# Patient Record
Sex: MALE | Race: WHITE | Employment: OTHER | URBAN - METROPOLITAN AREA
[De-identification: names, ages, dates, MRNs, and addresses within clinical notes are randomized per-mention and may not be internally consistent; named-entity substitution may affect disease eponyms.]

---

## 2017-01-01 ENCOUNTER — APPOINTMENT (OUTPATIENT)
Dept: GENERAL RADIOLOGY | Age: 77
DRG: 196 | End: 2017-01-01
Attending: INTERNAL MEDICINE
Payer: MEDICARE

## 2017-01-01 ENCOUNTER — HOSPITAL ENCOUNTER (OUTPATIENT)
Dept: GENERAL RADIOLOGY | Age: 77
Discharge: HOME | End: 2017-12-14
Payer: MEDICARE

## 2017-01-01 ENCOUNTER — APPOINTMENT (OUTPATIENT)
Dept: ULTRASOUND IMAGING | Age: 77
DRG: 196 | End: 2017-01-01
Attending: INTERNAL MEDICINE
Payer: MEDICARE

## 2017-01-01 ENCOUNTER — HOSPITAL ENCOUNTER (OUTPATIENT)
Dept: NEUROLOGY | Age: 77
Discharge: HOME | End: 2017-10-25
Payer: MEDICARE

## 2017-01-01 ENCOUNTER — HOSPITAL ENCOUNTER (INPATIENT)
Age: 77
LOS: 5 days | Discharge: HOME | DRG: 196 | End: 2017-12-23
Attending: INTERNAL MEDICINE | Admitting: INTERNAL MEDICINE
Payer: MEDICARE

## 2017-01-01 ENCOUNTER — HOSPITAL ENCOUNTER (OUTPATIENT)
Age: 77
Discharge: HOME | End: 2017-12-14
Payer: MEDICARE

## 2017-01-01 ENCOUNTER — HOSPITAL ENCOUNTER (OUTPATIENT)
Age: 77
Discharge: HOME | End: 2017-05-30
Payer: MEDICARE

## 2017-01-01 ENCOUNTER — HOSPITAL ENCOUNTER (OUTPATIENT)
Age: 77
Discharge: HOME | End: 2017-07-17
Payer: MEDICARE

## 2017-01-01 ENCOUNTER — APPOINTMENT (OUTPATIENT)
Dept: CT IMAGING | Age: 77
DRG: 196 | End: 2017-01-01
Attending: INTERNAL MEDICINE
Payer: MEDICARE

## 2017-01-01 ENCOUNTER — HOSPITAL ENCOUNTER (OUTPATIENT)
Dept: NUCLEAR MEDICINE | Age: 77
Discharge: HOME | End: 2017-10-30
Payer: MEDICARE

## 2017-01-01 ENCOUNTER — HOSPITAL ENCOUNTER (OUTPATIENT)
Dept: NUCLEAR MEDICINE | Age: 77
Discharge: HOME | End: 2017-10-31
Payer: MEDICARE

## 2017-01-01 ENCOUNTER — HOSPITAL ENCOUNTER (OUTPATIENT)
Age: 77
Discharge: HOME | End: 2017-10-10
Payer: MEDICARE

## 2017-01-01 VITALS
HEIGHT: 72 IN | SYSTOLIC BLOOD PRESSURE: 100 MMHG | WEIGHT: 182 LBS | DIASTOLIC BLOOD PRESSURE: 59 MMHG | TEMPERATURE: 97.7 F | HEART RATE: 78 BPM | OXYGEN SATURATION: 96 % | BODY MASS INDEX: 24.65 KG/M2 | RESPIRATION RATE: 16 BRPM

## 2017-01-01 DIAGNOSIS — E03.1 CONGENITAL HYPOTHYROIDISM WITHOUT GOITER: ICD-10-CM

## 2017-01-01 DIAGNOSIS — R05.9 COUGH: ICD-10-CM

## 2017-01-01 DIAGNOSIS — R93.89 INFILTRATE NOTED ON IMAGING STUDY: ICD-10-CM

## 2017-01-01 LAB
ABSOLUTE EOS #: 0 K/UL (ref 0–0.4)
ABSOLUTE IMMATURE GRANULOCYTE: ABNORMAL K/UL (ref 0–0.3)
ABSOLUTE LYMPH #: 1.7 K/UL (ref 1–4.8)
ABSOLUTE MONO #: 0.9 K/UL (ref 0.2–0.8)
ANCA MYELOPEROXIDASE: 11 AU/ML
ANCA PROTEINASE 3: 8 AU/ML
ANGIOTENSIN-CONVERTING ENZYME: 27 U/L (ref 8–52)
ANION GAP SERPL CALCULATED.3IONS-SCNC: 12 MMOL/L (ref 9–17)
ANION GAP SERPL CALCULATED.3IONS-SCNC: 13 MMOL/L (ref 9–17)
ANION GAP SERPL CALCULATED.3IONS-SCNC: 14 MMOL/L (ref 9–17)
ANION GAP SERPL CALCULATED.3IONS-SCNC: 16 MMOL/L (ref 9–17)
ANION GAP SERPL CALCULATED.3IONS-SCNC: 17 MMOL/L (ref 9–17)
ANTI DNA DOUBLE STRANDED: 6 IU/ML
ANTI-NUCLEAR ANTIBODY (ANA): NEGATIVE
ANTI-NUCLEAR ANTIBODY (ANA): NEGATIVE
BASOPHILS # BLD: 0 % (ref 0–2)
BASOPHILS ABSOLUTE: 0 K/UL (ref 0–0.2)
BILIRUBIN URINE: NEGATIVE
BNP INTERPRETATION: ABNORMAL
BNP INTERPRETATION: ABNORMAL
BUN BLDV-MCNC: 12 MG/DL (ref 8–23)
BUN BLDV-MCNC: 16 MG/DL (ref 8–23)
BUN BLDV-MCNC: 17 MG/DL (ref 8–23)
BUN BLDV-MCNC: 20 MG/DL (ref 8–23)
BUN/CREAT BLD: 11 (ref 9–20)
BUN/CREAT BLD: 14 (ref 9–20)
BUN/CREAT BLD: 16 (ref 9–20)
BUN/CREAT BLD: 20 (ref 9–20)
CALCIUM IONIZED: 1.27 MMOL/L (ref 1.13–1.33)
CALCIUM IONIZED: 1.4 MMOL/L (ref 1.13–1.33)
CALCIUM SERPL-MCNC: 10 MG/DL (ref 8.6–10.4)
CALCIUM SERPL-MCNC: 10.5 MG/DL (ref 8.6–10.4)
CALCIUM SERPL-MCNC: 9.3 MG/DL (ref 8.6–10.4)
CALCIUM SERPL-MCNC: 9.4 MG/DL (ref 8.6–10.4)
CHLORIDE BLD-SCNC: 101 MMOL/L (ref 98–107)
CHLORIDE BLD-SCNC: 102 MMOL/L (ref 98–107)
CHLORIDE BLD-SCNC: 92 MMOL/L (ref 98–107)
CHLORIDE BLD-SCNC: 95 MMOL/L (ref 98–107)
CHLORIDE BLD-SCNC: 98 MMOL/L (ref 98–107)
CHOLESTEROL, FASTING: 147 MG/DL
CHOLESTEROL/HDL RATIO: 3.3
CO2: 21 MMOL/L (ref 20–31)
CO2: 21 MMOL/L (ref 20–31)
CO2: 25 MMOL/L (ref 20–31)
CO2: 26 MMOL/L (ref 20–31)
CO2: 27 MMOL/L (ref 20–31)
COLOR: YELLOW
COMMENT UA: NORMAL
CREAT SERPL-MCNC: 0.99 MG/DL (ref 0.7–1.2)
CREAT SERPL-MCNC: 1.03 MG/DL (ref 0.7–1.2)
CREAT SERPL-MCNC: 1.06 MG/DL (ref 0.7–1.2)
CREAT SERPL-MCNC: 1.23 MG/DL (ref 0.7–1.2)
CULTURE: NO GROWTH
CULTURE: NORMAL
DIFFERENTIAL TYPE: ABNORMAL
DIGOXIN DATE LAST DOSE: ABNORMAL
DIGOXIN DOSE AMOUNT: ABNORMAL
DIGOXIN DOSE TIME: ABNORMAL
DIGOXIN LEVEL: <0.3 NG/ML (ref 0.5–2)
DIRECT EXAM: NORMAL
EOSINOPHILS RELATIVE PERCENT: 0 % (ref 1–4)
FIO2: 21
FIO2: 21
GBM ANTIBODY IGG: 7 AU/ML
GFR AFRICAN AMERICAN: >60 ML/MIN
GFR NON-AFRICAN AMERICAN: 57 ML/MIN
GFR NON-AFRICAN AMERICAN: >60 ML/MIN
GFR SERPL CREATININE-BSD FRML MDRD: ABNORMAL ML/MIN/{1.73_M2}
GFR SERPL CREATININE-BSD FRML MDRD: NORMAL ML/MIN/{1.73_M2}
GLUCOSE BLD-MCNC: 118 MG/DL (ref 70–99)
GLUCOSE BLD-MCNC: 79 MG/DL (ref 70–99)
GLUCOSE BLD-MCNC: 84 MG/DL (ref 70–99)
GLUCOSE FASTING: 89 MG/DL (ref 70–99)
GLUCOSE URINE: NEGATIVE
HCT VFR BLD CALC: 37.5 % (ref 41–53)
HCT VFR BLD CALC: 38 % (ref 41–53)
HCT VFR BLD CALC: 38.7 % (ref 41–53)
HCT VFR BLD CALC: 38.9 % (ref 41–53)
HDLC SERPL-MCNC: 45 MG/DL
HEMOGLOBIN: 12.2 G/DL (ref 13.5–17.5)
HEMOGLOBIN: 12.7 G/DL (ref 13.5–17.5)
HEMOGLOBIN: 13 G/DL (ref 13.5–17.5)
HEMOGLOBIN: 13.1 G/DL (ref 13.5–17.5)
IMMATURE GRANULOCYTES: ABNORMAL %
KETONES, URINE: NEGATIVE
LDL CHOLESTEROL: 81 MG/DL (ref 0–130)
LEUKOCYTE ESTERASE, URINE: NEGATIVE
LV EF: 55 %
LVEF MODALITY: NORMAL
LYMPHOCYTES # BLD: 29 % (ref 24–44)
Lab: NORMAL
MAGNESIUM: 2 MG/DL (ref 1.6–2.6)
MCH RBC QN AUTO: 30.5 PG (ref 26–34)
MCH RBC QN AUTO: 31.3 PG (ref 26–34)
MCH RBC QN AUTO: 31.9 PG (ref 26–34)
MCH RBC QN AUTO: 32.8 PG (ref 26–34)
MCHC RBC AUTO-ENTMCNC: 32.6 G/DL (ref 31–37)
MCHC RBC AUTO-ENTMCNC: 32.9 G/DL (ref 31–37)
MCHC RBC AUTO-ENTMCNC: 33.5 G/DL (ref 31–37)
MCHC RBC AUTO-ENTMCNC: 34.1 G/DL (ref 31–37)
MCV RBC AUTO: 93.6 FL (ref 80–100)
MCV RBC AUTO: 95 FL (ref 80–100)
MCV RBC AUTO: 95.2 FL (ref 80–100)
MCV RBC AUTO: 96.2 FL (ref 80–100)
MONOCYTES # BLD: 15 % (ref 1–7)
MRSA, DNA, NASAL: NORMAL
MYCOPLASMA PNEUMONIAE IGM: 0.02
NEGATIVE BASE EXCESS, ART: 4 (ref 0–2)
NEGATIVE BASE EXCESS, ART: 4 (ref 0–2)
NITRITE, URINE: NEGATIVE
O2 DEVICE/FLOW/%: ABNORMAL
O2 DEVICE/FLOW/%: ABNORMAL
PATIENT TEMP: ABNORMAL
PATIENT TEMP: ABNORMAL
PDW BLD-RTO: 13 % (ref 11.5–14.5)
PDW BLD-RTO: 13.3 % (ref 11.5–14.5)
PDW BLD-RTO: 13.6 % (ref 11.5–14.5)
PDW BLD-RTO: 14.2 % (ref 11.5–14.5)
PH UA: 6 (ref 5–8)
PLATELET # BLD: 187 K/UL (ref 130–400)
PLATELET # BLD: 202 K/UL (ref 130–400)
PLATELET # BLD: 222 K/UL (ref 130–400)
PLATELET # BLD: 227 K/UL (ref 130–400)
PLATELET ESTIMATE: ABNORMAL
PMV BLD AUTO: 8 FL (ref 6–12)
PMV BLD AUTO: 8.6 FL (ref 6–12)
PMV BLD AUTO: ABNORMAL FL (ref 6–12)
PMV BLD AUTO: ABNORMAL FL (ref 6–12)
POC HCO3: 18.9 MMOL/L (ref 22–27)
POC HCO3: 20.2 MMOL/L (ref 22–27)
POC O2 SATURATION: 94 %
POC O2 SATURATION: 95 %
POC PCO2 TEMP: ABNORMAL MM HG
POC PCO2 TEMP: ABNORMAL MM HG
POC PCO2: 23 MM HG (ref 32–45)
POC PCO2: 30 MM HG (ref 32–45)
POC PH TEMP: ABNORMAL
POC PH TEMP: ABNORMAL
POC PH: 7.44 (ref 7.35–7.45)
POC PH: 7.52 (ref 7.35–7.45)
POC PO2 TEMP: ABNORMAL MM HG
POC PO2 TEMP: ABNORMAL MM HG
POC PO2: 60 MM HG (ref 75–95)
POC PO2: 71 MM HG (ref 75–95)
POSITIVE BASE EXCESS, ART: ABNORMAL (ref 0–2)
POSITIVE BASE EXCESS, ART: ABNORMAL (ref 0–2)
POTASSIUM SERPL-SCNC: 3.9 MMOL/L (ref 3.7–5.3)
POTASSIUM SERPL-SCNC: 4 MMOL/L (ref 3.7–5.3)
POTASSIUM SERPL-SCNC: 4.1 MMOL/L (ref 3.7–5.3)
POTASSIUM SERPL-SCNC: 4.3 MMOL/L (ref 3.7–5.3)
POTASSIUM SERPL-SCNC: 4.4 MMOL/L (ref 3.7–5.3)
PRO-BNP: 3074 PG/ML
PRO-BNP: 5220 PG/ML
PROCALCITONIN: 0.14 NG/ML
PROSTATE SPECIFIC ANTIGEN: 0.09 UG/L
PROTEIN UA: NEGATIVE
PTH INTACT: 5.68 PG/ML (ref 15–65)
RBC # BLD: 3.95 M/UL (ref 4.5–5.9)
RBC # BLD: 4 M/UL (ref 4.5–5.9)
RBC # BLD: 4.07 M/UL (ref 4.5–5.9)
RBC # BLD: 4.09 M/UL (ref 4.5–5.9)
RBC # BLD: ABNORMAL 10*6/UL
RHEUMATOID FACTOR: <10 IU/ML
SEG NEUTROPHILS: 56 % (ref 36–66)
SEGMENTED NEUTROPHILS ABSOLUTE COUNT: 3.3 K/UL (ref 1.8–7.7)
SODIUM BLD-SCNC: 129 MMOL/L (ref 135–144)
SODIUM BLD-SCNC: 133 MMOL/L (ref 135–144)
SODIUM BLD-SCNC: 137 MMOL/L (ref 135–144)
SODIUM BLD-SCNC: 140 MMOL/L (ref 135–144)
SODIUM BLD-SCNC: 141 MMOL/L (ref 135–144)
SPECIFIC GRAVITY UA: 1.01 (ref 1–1.03)
SPECIMEN DESCRIPTION: NORMAL
STATUS: NORMAL
TCO2 (CALC), ART: 20 MMOL/L (ref 23–28)
TCO2 (CALC), ART: 21 MMOL/L (ref 23–28)
THYROXINE, FREE: 0.8 NG/DL (ref 0.93–1.7)
THYROXINE, FREE: 0.85 NG/DL (ref 0.93–1.7)
TRIGLYCERIDE, FASTING: 106 MG/DL
TSH SERPL DL<=0.05 MIU/L-ACNC: 2.86 MIU/L (ref 0.3–5)
TSH SERPL DL<=0.05 MIU/L-ACNC: 3.57 MIU/L (ref 0.3–5)
TURBIDITY: CLEAR
URINE HGB: NEGATIVE
UROBILINOGEN, URINE: NORMAL
VLDLC SERPL CALC-MCNC: NORMAL MG/DL (ref 1–30)
WBC # BLD: 5.1 K/UL (ref 3.5–11)
WBC # BLD: 5.9 K/UL (ref 3.5–11)
WBC # BLD: 6 K/UL (ref 3.5–11)
WBC # BLD: 6.3 K/UL (ref 3.5–11)
WBC # BLD: ABNORMAL 10*3/UL

## 2017-01-01 PROCEDURE — 84439 ASSAY OF FREE THYROXINE: CPT

## 2017-01-01 PROCEDURE — G8978 MOBILITY CURRENT STATUS: HCPCS

## 2017-01-01 PROCEDURE — G8979 MOBILITY GOAL STATUS: HCPCS

## 2017-01-01 PROCEDURE — 83516 IMMUNOASSAY NONANTIBODY: CPT

## 2017-01-01 PROCEDURE — 80048 BASIC METABOLIC PNL TOTAL CA: CPT

## 2017-01-01 PROCEDURE — 6370000000 HC RX 637 (ALT 250 FOR IP): Performed by: UROLOGY

## 2017-01-01 PROCEDURE — 2700000000 HC OXYGEN THERAPY PER DAY

## 2017-01-01 PROCEDURE — 6370000000 HC RX 637 (ALT 250 FOR IP): Performed by: INTERNAL MEDICINE

## 2017-01-01 PROCEDURE — 82330 ASSAY OF CALCIUM: CPT

## 2017-01-01 PROCEDURE — 51798 US URINE CAPACITY MEASURE: CPT

## 2017-01-01 PROCEDURE — 86225 DNA ANTIBODY NATIVE: CPT

## 2017-01-01 PROCEDURE — 6360000002 HC RX W HCPCS: Performed by: INTERNAL MEDICINE

## 2017-01-01 PROCEDURE — 2060000000 HC ICU INTERMEDIATE R&B

## 2017-01-01 PROCEDURE — 84443 ASSAY THYROID STIM HORMONE: CPT

## 2017-01-01 PROCEDURE — 86431 RHEUMATOID FACTOR QUANT: CPT

## 2017-01-01 PROCEDURE — 2580000003 HC RX 258: Performed by: INTERNAL MEDICINE

## 2017-01-01 PROCEDURE — 71010 XR CHEST PORTABLE: CPT

## 2017-01-01 PROCEDURE — 86038 ANTINUCLEAR ANTIBODIES: CPT

## 2017-01-01 PROCEDURE — 76775 US EXAM ABDO BACK WALL LIM: CPT

## 2017-01-01 PROCEDURE — 84145 PROCALCITONIN (PCT): CPT

## 2017-01-01 PROCEDURE — 94664 DEMO&/EVAL PT USE INHALER: CPT

## 2017-01-01 PROCEDURE — 85027 COMPLETE CBC AUTOMATED: CPT

## 2017-01-01 PROCEDURE — 97530 THERAPEUTIC ACTIVITIES: CPT

## 2017-01-01 PROCEDURE — 95909 NRV CNDJ TST 5-6 STUDIES: CPT | Performed by: PHYSICAL MEDICINE & REHABILITATION

## 2017-01-01 PROCEDURE — 3430000000 HC RX DIAGNOSTIC RADIOPHARMACEUTICAL: Performed by: INTERNAL MEDICINE

## 2017-01-01 PROCEDURE — 87641 MR-STAPH DNA AMP PROBE: CPT

## 2017-01-01 PROCEDURE — 80061 LIPID PANEL: CPT

## 2017-01-01 PROCEDURE — 93306 TTE W/DOPPLER COMPLETE: CPT

## 2017-01-01 PROCEDURE — 97116 GAIT TRAINING THERAPY: CPT

## 2017-01-01 PROCEDURE — A9512 TC99M PERTECHNETATE: HCPCS | Performed by: INTERNAL MEDICINE

## 2017-01-01 PROCEDURE — 94761 N-INVAS EAR/PLS OXIMETRY MLT: CPT

## 2017-01-01 PROCEDURE — 97165 OT EVAL LOW COMPLEX 30 MIN: CPT

## 2017-01-01 PROCEDURE — 71020 XR CHEST STANDARD TWO VW: CPT

## 2017-01-01 PROCEDURE — G8987 SELF CARE CURRENT STATUS: HCPCS

## 2017-01-01 PROCEDURE — 36415 COLL VENOUS BLD VENIPUNCTURE: CPT

## 2017-01-01 PROCEDURE — 51702 INSERT TEMP BLADDER CATH: CPT

## 2017-01-01 PROCEDURE — A9528 IODINE I-131 IODIDE CAP, DX: HCPCS | Performed by: INTERNAL MEDICINE

## 2017-01-01 PROCEDURE — 1200000000 HC SEMI PRIVATE

## 2017-01-01 PROCEDURE — 95886 MUSC TEST DONE W/N TEST COMP: CPT | Performed by: PHYSICAL MEDICINE & REHABILITATION

## 2017-01-01 PROCEDURE — 70450 CT HEAD/BRAIN W/O DYE: CPT

## 2017-01-01 PROCEDURE — 87086 URINE CULTURE/COLONY COUNT: CPT

## 2017-01-01 PROCEDURE — 94760 N-INVAS EAR/PLS OXIMETRY 1: CPT

## 2017-01-01 PROCEDURE — 87040 BLOOD CULTURE FOR BACTERIA: CPT

## 2017-01-01 PROCEDURE — 87449 NOS EACH ORGANISM AG IA: CPT

## 2017-01-01 PROCEDURE — 82803 BLOOD GASES ANY COMBINATION: CPT

## 2017-01-01 PROCEDURE — G8988 SELF CARE GOAL STATUS: HCPCS

## 2017-01-01 PROCEDURE — 78014 THYROID IMAGING W/BLOOD FLOW: CPT

## 2017-01-01 PROCEDURE — 83880 ASSAY OF NATRIURETIC PEPTIDE: CPT

## 2017-01-01 PROCEDURE — 97535 SELF CARE MNGMENT TRAINING: CPT

## 2017-01-01 PROCEDURE — 85025 COMPLETE CBC W/AUTO DIFF WBC: CPT

## 2017-01-01 PROCEDURE — 76872 US TRANSRECTAL: CPT

## 2017-01-01 PROCEDURE — 94660 CPAP INITIATION&MGMT: CPT

## 2017-01-01 PROCEDURE — 94640 AIRWAY INHALATION TREATMENT: CPT

## 2017-01-01 PROCEDURE — 94150 VITAL CAPACITY TEST: CPT

## 2017-01-01 PROCEDURE — 84153 ASSAY OF PSA TOTAL: CPT

## 2017-01-01 PROCEDURE — 80162 ASSAY OF DIGOXIN TOTAL: CPT

## 2017-01-01 PROCEDURE — 81003 URINALYSIS AUTO W/O SCOPE: CPT

## 2017-01-01 PROCEDURE — 83970 ASSAY OF PARATHORMONE: CPT

## 2017-01-01 PROCEDURE — 97161 PT EVAL LOW COMPLEX 20 MIN: CPT

## 2017-01-01 PROCEDURE — 82164 ANGIOTENSIN I ENZYME TEST: CPT

## 2017-01-01 PROCEDURE — 80051 ELECTROLYTE PANEL: CPT

## 2017-01-01 PROCEDURE — 36600 WITHDRAWAL OF ARTERIAL BLOOD: CPT

## 2017-01-01 PROCEDURE — 83735 ASSAY OF MAGNESIUM: CPT

## 2017-01-01 PROCEDURE — 71250 CT THORAX DX C-: CPT

## 2017-01-01 PROCEDURE — 86738 MYCOPLASMA ANTIBODY: CPT

## 2017-01-01 RX ORDER — LUBIPROSTONE 8 UG/1
8 CAPSULE, GELATIN COATED ORAL 2 TIMES DAILY WITH MEALS
Status: DISCONTINUED | OUTPATIENT
Start: 2017-01-01 | End: 2017-01-01

## 2017-01-01 RX ORDER — IPRATROPIUM BROMIDE AND ALBUTEROL SULFATE 2.5; .5 MG/3ML; MG/3ML
1 SOLUTION RESPIRATORY (INHALATION)
Status: DISCONTINUED | OUTPATIENT
Start: 2017-01-01 | End: 2017-01-01

## 2017-01-01 RX ORDER — GABAPENTIN 100 MG/1
100 CAPSULE ORAL 3 TIMES DAILY
COMMUNITY

## 2017-01-01 RX ORDER — SODIUM CHLORIDE 0.9 % (FLUSH) 0.9 %
10 SYRINGE (ML) INJECTION PRN
Status: DISCONTINUED | OUTPATIENT
Start: 2017-01-01 | End: 2017-01-01 | Stop reason: HOSPADM

## 2017-01-01 RX ORDER — POLYETHYLENE GLYCOL 3350 17 G/17G
17 POWDER, FOR SOLUTION ORAL DAILY
Status: DISCONTINUED | OUTPATIENT
Start: 2017-01-01 | End: 2017-01-01

## 2017-01-01 RX ORDER — DIGOXIN 250 MCG
0.5 TABLET ORAL DAILY
Status: DISCONTINUED | OUTPATIENT
Start: 2017-01-01 | End: 2017-01-01 | Stop reason: SDUPTHER

## 2017-01-01 RX ORDER — FUROSEMIDE 10 MG/ML
20 INJECTION INTRAMUSCULAR; INTRAVENOUS ONCE
Status: COMPLETED | OUTPATIENT
Start: 2017-01-01 | End: 2017-01-01

## 2017-01-01 RX ORDER — ACETAMINOPHEN 325 MG/1
325 TABLET ORAL EVERY 4 HOURS PRN
Status: DISCONTINUED | OUTPATIENT
Start: 2017-01-01 | End: 2017-01-01 | Stop reason: HOSPADM

## 2017-01-01 RX ORDER — LEVOFLOXACIN 500 MG/1
500 TABLET, FILM COATED ORAL DAILY
Qty: 10 TABLET | Refills: 0 | Status: SHIPPED | OUTPATIENT
Start: 2017-01-01 | End: 2018-01-01

## 2017-01-01 RX ORDER — LEVOFLOXACIN 5 MG/ML
500 INJECTION, SOLUTION INTRAVENOUS EVERY 24 HOURS
Status: DISCONTINUED | OUTPATIENT
Start: 2017-01-01 | End: 2017-01-01 | Stop reason: HOSPADM

## 2017-01-01 RX ORDER — TAMSULOSIN HYDROCHLORIDE 0.4 MG/1
0.4 CAPSULE ORAL DAILY
Status: DISCONTINUED | OUTPATIENT
Start: 2017-01-01 | End: 2017-01-01 | Stop reason: HOSPADM

## 2017-01-01 RX ORDER — ALBUTEROL SULFATE 2.5 MG/3ML
2.5 SOLUTION RESPIRATORY (INHALATION)
Status: DISCONTINUED | OUTPATIENT
Start: 2017-01-01 | End: 2017-01-01 | Stop reason: HOSPADM

## 2017-01-01 RX ORDER — DIGOXIN 250 MCG
0.5 TABLET ORAL DAILY
Status: DISCONTINUED | OUTPATIENT
Start: 2017-01-01 | End: 2017-01-01 | Stop reason: CLARIF

## 2017-01-01 RX ORDER — TAMSULOSIN HYDROCHLORIDE 0.4 MG/1
0.4 CAPSULE ORAL DAILY
Qty: 30 CAPSULE | Refills: 3 | Status: SHIPPED | OUTPATIENT
Start: 2017-01-01

## 2017-01-01 RX ORDER — TAMSULOSIN HYDROCHLORIDE 0.4 MG/1
0.8 CAPSULE ORAL ONCE
Status: COMPLETED | OUTPATIENT
Start: 2017-01-01 | End: 2017-01-01

## 2017-01-01 RX ORDER — GABAPENTIN 100 MG/1
100 CAPSULE ORAL 2 TIMES DAILY
Status: DISCONTINUED | OUTPATIENT
Start: 2017-01-01 | End: 2017-01-01 | Stop reason: HOSPADM

## 2017-01-01 RX ORDER — SODIUM CHLORIDE 9 MG/ML
INJECTION, SOLUTION INTRAVENOUS CONTINUOUS
Status: DISCONTINUED | OUTPATIENT
Start: 2017-01-01 | End: 2017-01-01 | Stop reason: HOSPADM

## 2017-01-01 RX ORDER — ALBUTEROL SULFATE 2.5 MG/3ML
2.5 SOLUTION RESPIRATORY (INHALATION) 3 TIMES DAILY
Status: DISCONTINUED | OUTPATIENT
Start: 2017-01-01 | End: 2017-01-01

## 2017-01-01 RX ORDER — IPRATROPIUM BROMIDE AND ALBUTEROL SULFATE 2.5; .5 MG/3ML; MG/3ML
1 SOLUTION RESPIRATORY (INHALATION)
Status: DISCONTINUED | OUTPATIENT
Start: 2017-01-01 | End: 2017-01-01 | Stop reason: HOSPADM

## 2017-01-01 RX ORDER — DILTIAZEM HYDROCHLORIDE 360 MG/1
360 CAPSULE, EXTENDED RELEASE ORAL DAILY
Status: ON HOLD | COMMUNITY
End: 2017-01-01 | Stop reason: HOSPADM

## 2017-01-01 RX ORDER — ASPIRIN 81 MG/1
81 TABLET, CHEWABLE ORAL DAILY
Status: DISCONTINUED | OUTPATIENT
Start: 2017-01-01 | End: 2017-01-01 | Stop reason: HOSPADM

## 2017-01-01 RX ADMIN — ENOXAPARIN SODIUM 40 MG: 40 INJECTION SUBCUTANEOUS at 08:25

## 2017-01-01 RX ADMIN — IPRATROPIUM BROMIDE AND ALBUTEROL SULFATE 1 AMPULE: .5; 3 SOLUTION RESPIRATORY (INHALATION) at 19:56

## 2017-01-01 RX ADMIN — ASPIRIN 81 MG 81 MG: 81 TABLET ORAL at 09:23

## 2017-01-01 RX ADMIN — TAMSULOSIN HYDROCHLORIDE 0.4 MG: 0.4 CAPSULE ORAL at 07:48

## 2017-01-01 RX ADMIN — GABAPENTIN 100 MG: 100 CAPSULE ORAL at 09:23

## 2017-01-01 RX ADMIN — LUBIPROSTONE 8 MCG: 8 CAPSULE, GELATIN COATED ORAL at 10:58

## 2017-01-01 RX ADMIN — SODIUM CHLORIDE: 9 INJECTION, SOLUTION INTRAVENOUS at 00:37

## 2017-01-01 RX ADMIN — GABAPENTIN 100 MG: 100 CAPSULE ORAL at 15:47

## 2017-01-01 RX ADMIN — ENOXAPARIN SODIUM 40 MG: 40 INJECTION SUBCUTANEOUS at 10:10

## 2017-01-01 RX ADMIN — IPRATROPIUM BROMIDE AND ALBUTEROL SULFATE 1 AMPULE: .5; 3 SOLUTION RESPIRATORY (INHALATION) at 15:27

## 2017-01-01 RX ADMIN — POLYETHYLENE GLYCOL 3350 17 G: 17 POWDER, FOR SOLUTION ORAL at 16:47

## 2017-01-01 RX ADMIN — SODIUM CHLORIDE, PRESERVATIVE FREE 10 ML: 5 INJECTION INTRAVENOUS at 09:00

## 2017-01-01 RX ADMIN — ASPIRIN 81 MG 81 MG: 81 TABLET ORAL at 09:16

## 2017-01-01 RX ADMIN — ALBUTEROL SULFATE 2.5 MG: 2.5 SOLUTION RESPIRATORY (INHALATION) at 19:46

## 2017-01-01 RX ADMIN — IPRATROPIUM BROMIDE AND ALBUTEROL SULFATE 1 AMPULE: .5; 3 SOLUTION RESPIRATORY (INHALATION) at 16:01

## 2017-01-01 RX ADMIN — ENOXAPARIN SODIUM 40 MG: 40 INJECTION SUBCUTANEOUS at 15:47

## 2017-01-01 RX ADMIN — TAMSULOSIN HYDROCHLORIDE 0.4 MG: 0.4 CAPSULE ORAL at 09:16

## 2017-01-01 RX ADMIN — ASPIRIN 81 MG 81 MG: 81 TABLET ORAL at 07:48

## 2017-01-01 RX ADMIN — LEVOFLOXACIN 500 MG: 5 INJECTION, SOLUTION INTRAVENOUS at 13:14

## 2017-01-01 RX ADMIN — ENOXAPARIN SODIUM 40 MG: 40 INJECTION SUBCUTANEOUS at 10:57

## 2017-01-01 RX ADMIN — LUBIPROSTONE 8 MCG: 8 CAPSULE, GELATIN COATED ORAL at 17:41

## 2017-01-01 RX ADMIN — GABAPENTIN 100 MG: 100 CAPSULE ORAL at 07:48

## 2017-01-01 RX ADMIN — TAMSULOSIN HYDROCHLORIDE 0.4 MG: 0.4 CAPSULE ORAL at 09:23

## 2017-01-01 RX ADMIN — DILTIAZEM HYDROCHLORIDE 30 MG: 30 TABLET, FILM COATED ORAL at 09:23

## 2017-01-01 RX ADMIN — IPRATROPIUM BROMIDE AND ALBUTEROL SULFATE 1 AMPULE: .5; 3 SOLUTION RESPIRATORY (INHALATION) at 12:25

## 2017-01-01 RX ADMIN — IPRATROPIUM BROMIDE AND ALBUTEROL SULFATE 1 AMPULE: .5; 3 SOLUTION RESPIRATORY (INHALATION) at 15:44

## 2017-01-01 RX ADMIN — DILTIAZEM HYDROCHLORIDE 30 MG: 30 TABLET, FILM COATED ORAL at 09:16

## 2017-01-01 RX ADMIN — GABAPENTIN 100 MG: 100 CAPSULE ORAL at 09:16

## 2017-01-01 RX ADMIN — GABAPENTIN 100 MG: 100 CAPSULE ORAL at 20:18

## 2017-01-01 RX ADMIN — IPRATROPIUM BROMIDE AND ALBUTEROL SULFATE 1 AMPULE: .5; 3 SOLUTION RESPIRATORY (INHALATION) at 11:44

## 2017-01-01 RX ADMIN — IPRATROPIUM BROMIDE AND ALBUTEROL SULFATE 1 AMPULE: .5; 3 SOLUTION RESPIRATORY (INHALATION) at 11:42

## 2017-01-01 RX ADMIN — ASPIRIN 81 MG 81 MG: 81 TABLET ORAL at 08:24

## 2017-01-01 RX ADMIN — GABAPENTIN 100 MG: 100 CAPSULE ORAL at 20:47

## 2017-01-01 RX ADMIN — IPRATROPIUM BROMIDE AND ALBUTEROL SULFATE 1 AMPULE: .5; 3 SOLUTION RESPIRATORY (INHALATION) at 20:12

## 2017-01-01 RX ADMIN — ALBUTEROL SULFATE 2.5 MG: 2.5 SOLUTION RESPIRATORY (INHALATION) at 04:23

## 2017-01-01 RX ADMIN — DILTIAZEM HYDROCHLORIDE 30 MG: 30 TABLET, FILM COATED ORAL at 08:25

## 2017-01-01 RX ADMIN — LEVOFLOXACIN 500 MG: 5 INJECTION, SOLUTION INTRAVENOUS at 12:35

## 2017-01-01 RX ADMIN — ENOXAPARIN SODIUM 40 MG: 40 INJECTION SUBCUTANEOUS at 09:23

## 2017-01-01 RX ADMIN — GABAPENTIN 100 MG: 100 CAPSULE ORAL at 08:24

## 2017-01-01 RX ADMIN — POLYETHYLENE GLYCOL 3350 17 G: 17 POWDER, FOR SOLUTION ORAL at 09:23

## 2017-01-01 RX ADMIN — ASPIRIN 81 MG 81 MG: 81 TABLET ORAL at 10:56

## 2017-01-01 RX ADMIN — GABAPENTIN 100 MG: 100 CAPSULE ORAL at 10:57

## 2017-01-01 RX ADMIN — GABAPENTIN 100 MG: 100 CAPSULE ORAL at 21:25

## 2017-01-01 RX ADMIN — LEVOFLOXACIN 500 MG: 5 INJECTION, SOLUTION INTRAVENOUS at 12:23

## 2017-01-01 RX ADMIN — Medication 20 MICRO CURIE: at 09:00

## 2017-01-01 RX ADMIN — Medication 15 MILLICURIE: at 09:00

## 2017-01-01 RX ADMIN — ALBUTEROL SULFATE 2.5 MG: 2.5 SOLUTION RESPIRATORY (INHALATION) at 14:53

## 2017-01-01 RX ADMIN — TAMSULOSIN HYDROCHLORIDE 0.4 MG: 0.4 CAPSULE ORAL at 10:58

## 2017-01-01 RX ADMIN — ENOXAPARIN SODIUM 40 MG: 40 INJECTION SUBCUTANEOUS at 09:16

## 2017-01-01 RX ADMIN — GABAPENTIN 100 MG: 100 CAPSULE ORAL at 20:01

## 2017-01-01 RX ADMIN — IPRATROPIUM BROMIDE AND ALBUTEROL SULFATE 1 AMPULE: .5; 3 SOLUTION RESPIRATORY (INHALATION) at 08:13

## 2017-01-01 RX ADMIN — IPRATROPIUM BROMIDE AND ALBUTEROL SULFATE 1 AMPULE: .5; 3 SOLUTION RESPIRATORY (INHALATION) at 08:17

## 2017-01-01 RX ADMIN — ACETAMINOPHEN 325 MG: 325 TABLET ORAL at 05:54

## 2017-01-01 RX ADMIN — DILTIAZEM HYDROCHLORIDE 30 MG: 30 TABLET, FILM COATED ORAL at 07:48

## 2017-01-01 RX ADMIN — TAMSULOSIN HYDROCHLORIDE 0.8 MG: 0.4 CAPSULE ORAL at 20:01

## 2017-01-01 RX ADMIN — ALBUTEROL SULFATE 2.5 MG: 2.5 SOLUTION RESPIRATORY (INHALATION) at 09:53

## 2017-01-01 RX ADMIN — GABAPENTIN 100 MG: 100 CAPSULE ORAL at 21:04

## 2017-01-01 RX ADMIN — FUROSEMIDE 20 MG: 10 INJECTION, SOLUTION INTRAMUSCULAR; INTRAVENOUS at 06:32

## 2017-01-01 ASSESSMENT — PAIN SCALES - GENERAL
PAINLEVEL_OUTOF10: 0

## 2017-12-18 PROBLEM — R06.02 SHORTNESS OF BREATH: Status: ACTIVE | Noted: 2017-01-01

## 2017-12-18 PROBLEM — R09.02 HYPOXEMIA REQUIRING SUPPLEMENTAL OXYGEN: Status: ACTIVE | Noted: 2017-01-01

## 2017-12-18 PROBLEM — Z99.81 HYPOXEMIA REQUIRING SUPPLEMENTAL OXYGEN: Status: ACTIVE | Noted: 2017-01-01

## 2017-12-18 PROBLEM — J84.9 INTERSTITIAL LUNG DISEASE (HCC): Status: ACTIVE | Noted: 2017-01-01

## 2017-12-18 NOTE — PROGRESS NOTES
Pharmacy Accuracy Service Medication History Note    The patient's list of current home medications has been reviewed. Source(s) of information: patient, Teresa, [address]    Based on information provided by the above source(s), I have updated the patient's home med list as described below. Please review the ACTION REQUESTED BY PHYSICIAN section of this note below for any discrepancies on current hospital orders. I changed or updated the following medications on the patient's home medication list:  Discontinued  Minocycline 100mg   Digoxin 0.25mg (pt states this was stopped about a year ago; no Rx on file at [facility], no fills reported in [facility])     Added · Gabapentin 100mg TID     Adjusted   · Cardizem 30mg daily adjusted to Cardizem CD 360mg daily   Other Notes · He states he recently started a sample inhaler to use BID for short-term use but he couldn't remember the name of it. He couldn't remember what it looked like or identify it from my pictures of inhalers. PHYSICIAN ACTION REQUESTED  Discrepancies on current hospital orders that need to be addressed by a physician:    Medication Action Requested   Cardizem   Please adjust order to match home dose if appropriate. Gabapentin Please adjust order to match home dose if appropriate. Please feel free to call me with any questions about this encounter. Thank you.     Rd Bhagat, Mary  Pharmacy Medication Accuracy Review Service  Phone:  [phone]  Fax: [phone]      Electronically signed by Rd Bhagat, [facility] on 12/18/2017 at 6:31 PM

## 2017-12-18 NOTE — H&P
Social History     Social History    Marital status:      Spouse name: N/A    Number of children: N/A    Years of education: N/A     Occupational History    Not on file. Social History Main Topics    Smoking status: Former Smoker    Smokeless tobacco: Not on file    Alcohol use Yes    Drug use: No    Sexual activity: Not on file     Other Topics Concern    Not on file     Social History Narrative    No narrative on file       Family History:   Negative for DM, pulmonary problems, malignancy. REVIEW OF SYSTEMS:  CONSTITUTIONAL:  negative for  sweats, fatigue, malaise and anorexia  EYES:  negative for  double vision, blurred vision, dry eyes and blind spots  HEENT:  negative for  hearing loss, tinnitus, ear drainage, earaches and epistaxis  RESPIRATORY:  negative for  wheezing, hemoptysis and pleuritic pain, see present illness. CARDIOVASCULAR:  negative for  palpitations, orthopnea, PND, exertional chest pressure/discomfort, edema, syncope  GASTROINTESTINAL:  negative for vomiting, change in bowel habits, diarrhea, constipation, abdominal pain, dysphagia, reflux, odynophagia, hematemesis and hemtochezia  GENITOURINARY:  negative for dysuria, nocturia, hesitancy and hematuria  INTEGUMENT/BREAST:  negative for rash, skin lesion(s) and skin color change  HEMATOLOGIC/LYMPHATIC:  negative for easy bruising, bleeding, lymphadenopathy, petechiae and swelling/edema  ALLERGIC/IMMUNOLOGIC:  No known allergies. ENDOCRINE:  negative for heat intolerance, cold intolerance, tremor and change in bowel habits  MUSCULOSKELETAL:  negative for  joint swelling, decreased range of motion, muscle weakness and bone pain  NEUROLOGICAL:  negative for headaches, dizziness, seizures, memory problems, speech problems, visual disturbance, coordination problems and syncope. Recent dx of peripheral neuropathy. Started on neurontin - helping symptoms at current dose of 200 mg/day.   BEHAVIOR/PSYCH:  negative for depressed mood    PHYSICAL EXAM:  Vitals:  /72   Pulse 75   Temp 97.3 °F (36.3 °C) (Oral)   Resp 18   SpO2 97%     General Appearance: alert and oriented to person, place and time and in no acute distress  Skin: warm and dry, no rash or erythema and no suspicious lesions noted  Head: normocephalic and atraumatic  Eyes: pupils equal, round, and reactive to light, extraocular eye movements intact, conjunctivae normal and sclera anicteric  ENT: tympanic membrane, external ear and ear canal normal bilaterally, oropharynx clear and moist with normal mucous membranes  Neck: neck supple and non tender without mass, no thyromegaly, no thyroid nodules and no cervical adenopathy. trachea midline, no stridor. No neck vein distention, no carotid bruits. Pulmonary/Chest: diminished breath sounds, no wheezing, ronchi, rales or friction rubs. Cardiovascular: normal rate, regular rhythm, normal S1 and S2, no murmurs, no gallops, intact distal pulses and no carotid bruits  Abdomen: soft, non-tender, non-distended, bowel sounds normal, no masses, no organomegaly and no abdominal bruits  Genitourinary: deferred. Extremities: no cyanosis, no clubbing, no edema and Ananya's sign negative bilaterally  Musculoskeletal: no swollen joints, no joint deformity, no tender joints and no joint instability  Neurologic: reflexes normal and symmetric, no cranial nerve deficit and speech normal      DATA:  Chest X-ray (14 Dec):Diffuse interstitial opacities identified predominantly involving the lower lobes new since study of March 2014. Diff dx per radiologist: developing pulmonary edema, pneumonia or possibly interstitial lung process. Blood work (14 Dec): WBC  6.0    Hb 12.7  Hct 38.7  BUN/Creatinine: 16/1.03  Est GFR  >60  Calcium: 10.5    ASSESSMENT AND PLAN:      Active Problems:    Shortness of breath    Interstitial lung disease ([address]    Admission orders made. Meds reconciled. Details in EHR.       Electronically signed by Rekha Mariee MD on 12/18/2017 at 1:02 PM

## 2017-12-19 NOTE — CARE COORDINATION
Case Management Initial Discharge Plan  [place],         Readmission Risk              Readmission Risk:        4.75       Age 72 or Greater:  1    Admitted from SNF or Requires Paid or Family Care:  0    Currently has CHF,COPD,ARF,CRI,or is on dialysis:  0    Takes more than 5 Prescription Medications:  0    Takes Digoxin,Insulin,Anticoagulants,Narcotics or ASA/Plavix:  [address] in Past 12 Months:  0    On Disability:  0    Patient Considers own Health:  3.75            Met with:patient to discuss discharge plans. Information verified: address, contacts, phone number, , insurance Yes  PCP: Rudy Lucas MD  Date of last visit:     Insurance Provider:  Joleen Macias and Roshan Corrales    Discharge Planning  Current Residence:  Mobile home  Living Arrangements:  MAMI Gentile has 1 stories/4 stairs to climb  Support Systems:  Friends/Neighbors, Family Members  Current Services PTA:   none Agency:  none  Patient able to perform ADL's:Independent  DME in home:   none  DME used to aid ambulation prior to admission:    none  DME used during admission:   none    Potential Assistance Needed:  N/A    Pharmacy: [facility] Medications:  No  Does patient want to participate in local refill/ meds to beds program?  No    Patient agreeable to home care: No  [place] of choice provided:  n/a      Type of Home Care Services:  None  Patient expects to be discharged to:  Home    Prior SNF/Rehab Placement and Facility:  none  Agreeable to SNF/Rehab: No  [place] of choice provided: n/a   Evaluation: no    Expected Discharge date:  17  Follow Up Appointment: Best Day/ Time:      Transportation provider: self  Transportation arrangements needed for discharge: No    Discharge Plan:  Met with pt at bedside. Pt lives alone and is independent at home. Pt admitted with urinary retention and currently has a irizarry catheter.   Discussed potential for pt to be

## 2017-12-19 NOTE — PROGRESS NOTES
Dr. Bharathi Sims notified that patient just recently informed RN that he was having difficulty voiding. Patient voided 100 ml on his own and bladder scan was done to check post void residual and showed 462 ml of urine. Orders received to insert irizarry catheter, send urine for culture and prostate ultrasound.

## 2017-12-19 NOTE — FLOWSHEET NOTE
Patient states he is doing better and expressed no major needs or worries.  left prayer card for possible follow up. Patient grateful for the visit. 12/18/17 2012   Encounter Summary   Services provided to: Patient   Referral/Consult From: [facility] Family members   Continue Visiting ([phone])   Complexity of Encounter Low   Length of Encounter 15 minutes   Spiritual Assessment Completed Yes   Routine   Type Initial   Assessment Calm; Approachable;Passive   Intervention Explored feelings, thoughts, concerns; Discussed belief system/Orthodox practices/román;Discussed illness/injury and it's impact   Outcome Expressed gratitude

## 2017-12-19 NOTE — PROGRESS NOTES
Seen/Examined. Alert, in no distress. Hemodynamically stable. Echocardiogram: global left ventricular systolic function is normal with an estimated ejection fraction of 55%. No obvious wall motion abnormality. Mild      mitral/tricuspid regurg. Normal RV size and function. No pulmonary hypertension. Lungs: epsodic cough, whitish non-bloody sputum. Diminished breath sounds, mild ronchi, no rales/rubs. ABGs: hypoxemia    CT chest: multiple mediastinal lymph nodes, centrilobar /paraseptal emphysema. There is a 12x21 mm spiculated pleural parenchymal lesion in the       right apex. See details of report in EHR. Abdomen: complaints of constipation. Abdomen is soft, non-tender with no palpable mass. : noted acute urinary retention last evening with large residual on bladder scan requiring Viera insertion. RN informed me that there was some difficulty in inserting Viera. Prostate Ultrasound: prostatic seeds. No acute prostate abnormality identified. Predicted PSA 2.34    Assessment:    Emphysema, Interstitial Lung Disease. Right apical lesion concerning for possible malignancy. Urinary retention probably sec to bladder outlet obstruction (radiation induced). Plan:    Pulmonary consult -     Urology Consult    Aerosol Rxs - see physical findings. Discussed above with patient - expressed understanding.

## 2017-12-19 NOTE — PROGRESS NOTES
Lupe Gilliam, [facility]. Shortness of breath [R06.02]  Shortness of breath [R06.02] . Vitals:    12/19/17 0720   BP: 122/65   Pulse: 74   Resp: 20   Temp: 97.7 °F (36.5 °C)   SpO2: 96%   . Patients home meds are   Prior to Admission medications    Medication Sig Start Date End Date Taking? Authorizing Provider   diltiazem (CARDIZEM CD) 360 MG extended release capsule Take 360 mg by mouth daily   Yes Historical Provider, MD   gabapentin (NEURONTIN) 100 MG capsule Take 100 mg by mouth 3 times daily   Yes Historical Provider, MD   aspirin 81 MG tablet Take 81 mg by mouth daily.    Yes Historical Provider, MD   .  Recent Surgical History: None = 0     Assessment     FEV1/FVC    FEV1 Predicted       FEV1 1.8    FEV1 % Predicted 58%  FVC   IS volume   IBW   FIO2% RA   SPO2 96%  RR 18  Breath Sounds: dim      · Bronchodilator assessment at level  2  · Hyperinflation assessment at level   · Secretion Management assessment at level    ·   · [x]    Bronchodilator Assessment  BRONCHODILATOR ASSESSMENT SCORE  Score 0 1 2 3 4 5   Breath Sounds   []  Patient Baseline []  No Wheeze good aeration [x]  Faint, scattered wheezing, good aeration []  Expiratory Wheezing and or moderately diminished []  Insp/Exp wheeze and/or very diminished []  Insp/Exp and/ or marked distress   Respiratory Rate   []  Patient Baseline [x]  Less than 20 [x]  Less than 20 []  20-25 []  Greater than 25 []  Greater than 25   Peak flow % of Pred or PB []  NA   []  Greater than 90%  []  81-90% []  71-80% []  Less than or equal to 70%  or unable to perform []  Unable due to Respiratory Distress   Dyspnea re []  Patient Baseline [x]  No SOB [x]  No SOB []  SOB on exertion []  SOB min activity []  At rest/acute   e FEV% Predicted       []  NA []  Above 69%  []  Unable []  Above 60-69%  []  Unable [x]  Above 50-59%  []  Unable []  Above 35-49%  []  Unable []  Less than 35%  []  Unable                 []  Hyperinflation

## 2017-12-20 NOTE — PROGRESS NOTES
Seen/Examined. Vitals Stable. NSR, no ectopics. Pulmonary: states breathing is better with aerosol Rx. Compared to yesterday breath sounds are better. Awaiting for Pulmonary Service to evaluate. GI: still constipated - no response to Miralax. Abdomen is soft, non-distended, non-tender. Bowel sounds normal.  : Dr. Barriga Troy notes appreciated. Renal ultrasound: no evidence of hydronephrosis or intrarenal stones. Urine C/S report pending. Assessment: see yesterday's note. Plan:    Continuing current regimen    Awaiting Pulmonary eval.    D/C Miralax. Trial of Amitiza for constipation.

## 2017-12-20 NOTE — PLAN OF CARE
Problem: Falls - Risk of  Goal: Absence of falls  Outcome: Met This Shift  Siderails up x 2  Hourly rounding. Call light in reach. Instructed to call for assist before attempting out of bed. Remains free from falls and accidental injury at this time. Floor free from obstacles, and bed is locked and in lowest position. Adequate lighting provided.

## 2017-12-20 NOTE — CONSULTS
Inpatient consult to Pulmonology  Consult performed by: Michelle Scale  Consult ordered by: Himanshu Jimenes         Pulmonary Medicine and [address] MD      Patient - Alma Postal   MRN -  [id number]   Deniz # - [de-identified]   - 1940      Date of Admission -  2017 12:23 PM  Date of evaluation -  2017  Room -    Abimbola Edwards MD Primary Care Physician - Colby Beard MD     Reason for Consult    Pulmonary nodule    Assessment   · Spiculated right apical pulmonary nodule/fibrosis  · Pulmonary fibrosis/mediastinal lymphadenopathy  · 20 pack year smoking, quit in   · Arthritis  · A. fib    Recommendations   · Obtained collagen-vascular disease and vasculitis workup  · Since right apical pulmonary nodule could be part of the generalized pulmonary fibrosis seen on the CAT scan, I will proceed with PET/CT scan as next step, can be done as an outpatient. If positive will consider CT-guided biopsy. · Albuterol Q 4 hours prn  · Nocturnal pulse oximetry  · PFTs, can be done as an outpatient  · 2 liters/min via nasal cannula  · DVT prophylaxis with low molecular weight heparin  · Will follow with you    Problem List      Patient Active Problem List   Diagnosis    Chest pain    Shortness of breath    Interstitial lung disease ([address]    Hypoxemia requiring supplemental oxygen       HPI     Alma Postal is 76 y.o.,  male, admitted because of shortness of breath. A CT scan of the chest was done which reveals pulmonary fibrosis and a pulmonary nodule. Patient denies any chest pain, cough, significant shortness of breath. He denies any hemoptysis. He quit smoking in  you smoke less than pack a day, less than 20-pack-year smoking.   PMHx   Past Medical History      Diagnosis Date    Arthritis of both knees     Atrial fibrillation ([address]     Cancer [facility])     prostate  - resolved radiation seeds    Chest pain     rashes, itching, open sores    Vitals     height is 6' (1.829 m) and weight is 182 lb (82.6 kg). His oral temperature is 97.6 °F (36.4 °C). His blood pressure is 119/72 and his pulse is 92. His respiration is 16 and oxygen saturation is 96%. Body mass index is 24.68 kg/m². I/O      Intake/Output Summary (Last 24 hours) at 12/20/17 1650  Last data filed at 12/20/17 0916   Gross per 24 hour   Intake                0 ml   Output             3125 ml   Net            -3125 ml     I/O last 3 completed shifts:  In: -   Out: 4805 [Urine:3275]   Patient Vitals for the past 96 hrs (Last 3 readings):   Weight   12/18/17 1249 182 lb (82.6 kg)     Exam   General Appearance  Awake, alert, oriented, in no acute distress  HEENT - Head is normocephalic, atraumatic. Pupil reactive to light  Neck - Supple, symmetrical, trachea midline and Soft, trachea midline and straight  Lungs - positive findings: rales bibasilar, mild  Cardiovascular - Heart sounds are normal.  Regular rhythm normal rate without murmur, gallop or rub. Abdomen - Soft, nontender, nondistended, no masses or organomegaly  Neurologic - CN II-XII are grossly intact.  There are no focal motor or sensory deficits  Skin - No bruising or bleeding  Extremities - No cyanosis, clubbing or edema    Labs  - Old records and notes have been reviewed in [facility]   CBC   Lab Results   Component Value Date    WBC 6.0 12/14/2017    RBC 4.07 12/14/2017    HGB 12.7 12/14/2017    HCT 38.7 12/14/2017     12/14/2017    MCV 95.0 12/14/2017    MCH 31.3 12/14/2017    MCHC 32.9 12/14/2017    RDW 14.2 12/14/2017    LYMPHOPCT 38 04/01/2014    MONOPCT 11 04/01/2014    BASOPCT 1 04/01/2014    MONOSABS 0.71 04/01/2014    LYMPHSABS 2.46 04/01/2014    EOSABS 0.13 04/01/2014    BASOSABS 0.07 04/01/2014    DIFFTYPE NOT REPORTED 04/01/2014     BMP Lab Results   Component Value Date     12/14/2017    K 4.4 12/14/2017     12/14/2017    CO2 27 12/14/2017    BUN 16 12/14/2017

## 2017-12-20 NOTE — PROGRESS NOTES
[facility]                                                                                  Urology Progress Note            Subjective: Follow-up urinary retention, catheter removal today for trial of voiding    Patient Vitals for the past 24 hrs:   BP Temp Temp src Pulse Resp SpO2   12/20/17 0011 111/60 98.2 °F (36.8 °C) Oral 95 16 96 %   12/19/17 1934 116/67 98.2 °F (36.8 °C) Oral 91 16 95 %       Intake/Output Summary (Last 24 hours) at 12/20/17 0725  Last data filed at 12/20/17 0503   Gross per 24 hour   Intake                0 ml   Output             5375 ml   Net            -5375 ml       No results for input(s): WBC, HGB, HCT, MCV, PLT in the last 72 hours. No results for input(s): NA, K, CL, CO2, PHOS, BUN, CREATININE in the last 72 hours.     Invalid input(s): CA    Recent Labs      12/19/17   0306   COLORU  YELLOW   PHUR  6.0   SPECGRAV  1.015   LEUKOCYTESUR  NEGATIVE   UROBILINOGEN  Normal   BILIRUBINUR  NEGATIVE       Additional Lab/culture results:    Physical Exam: Patient in bed, not in acute distress, discussed catheter removal and Flomax to alleviate bladder outlet obstruction    Interval Imaging Findings:    Impression:  Prostate cancer urinary retention  Patient Active Problem List   Diagnosis    Chest pain    Shortness of breath    Interstitial lung disease ([address]    Hypoxemia requiring supplemental oxygen       Plan: Renal ultrasound today, to rule out hydronephrosis    Gregoria Gist  7:25 AM 12/20/2017

## 2017-12-20 NOTE — CONSULTS
[facility]  Urology Consultation    Patient:  Fabian Davis  MRN: [id number]  YOB: 1940    CHIEF COMPLAINT:  Urinary retention prostate cancer    HISTORY OF PRESENT ILLNESS:   The patient is a 76 y.o. male who presents with difficulty emptying the bladder, prostate and C weakness of the stream, bladder scan demonstrated 500 mL residual.    The patient has a history of prostate cancer, he is status post radiation therapy, PSA levels have been quite low and the prostate cancer under good control    Patient's old records, notes and chart reviewed and summarized above. Past Medical History:    Past Medical History:   Diagnosis Date    Arthritis of both knees     [facility]     Cancer ([facility]     prostate  - resolved radiation seeds    Chest pain     History of cardiac cath     Hx MRSA infection resolved 12/2017    2 negative nasal screen - 12/2017 (right knee - 2009)    Sweating        Past Surgical History:    Past Surgical History:   Procedure Laterality Date    APPENDECTOMY      JOINT REPLACEMENT Bilateral     knees    TONSILLECTOMY       Previous  surgery: Radiation therapy for prostate cancer     Medications:    Scheduled Meds:   polyethylene glycol  17 g Oral Daily    albuterol  2.5 mg Nebulization TID    tamsulosin  0.4 mg Oral Daily    aspirin  81 mg Oral Daily    diltiazem  30 mg Oral Daily    gabapentin  100 mg Oral BID    enoxaparin  40 mg Subcutaneous Daily     Continuous Infusions:   PRN Meds:.albuterol    Allergies:  Review of patient's allergies indicates no known allergies. Social History:    Social History     Social History    Marital status:      Spouse name: N/A    Number of children: N/A    Years of education: N/A     Occupational History    Not on file.      Social History Main Topics    Smoking status: Former Smoker    Smokeless tobacco: Never Used      Comment: quit smoking greater than 30 years ago    Alcohol use Yes   

## 2017-12-21 PROBLEM — N20.1 URETERAL CALCULUS, RIGHT: Status: ACTIVE | Noted: 2017-01-01

## 2017-12-21 PROBLEM — C61 CA PROSTATE, ADENOCA (HCC): Status: ACTIVE | Noted: 2017-01-01

## 2017-12-21 NOTE — CARE COORDINATION
Discharge planning    Patient potential for home oxygen and has medicare. Did send referral to [facility] to run his benefits if 02 needed. If home with 02 might want to consider home care also.  Will continue to follow closely

## 2017-12-21 NOTE — PROGRESS NOTES
At approximately [phone], patient was very short of breath and wheezing, stating \"I can't breathe. \" Respiratory therapist called to room for breathing treatment. At that time, patient's oxygen level was 76% on 2 liters O2 per nasal cannula. Breathing treatment administered. Blood pressure assessed as charted. Following breathing treatment, patient was placed on venti mask at 40%. Patient appeared calmer and O2 saturation was 90%. Patient had 6 beat run of V-tach per telemetry monitor at 0445. Dr. Lizz Acosta notified of patient's dififculty breathing, oxygen level, and run of V-tach. Order to transfer patient to [facility]. Dr. Lizz Acosta also stated to call Dr. Orville Barker, pulmonary, to notify him in patient's condition.

## 2017-12-21 NOTE — PROGRESS NOTES
[facility]                                                                                  Urology Progress Note            Subjective: follow up urine retention ca prostate     Patient Vitals for the past 24 hrs:   BP Temp Temp src Pulse Resp SpO2   12/21/17 1148 105/60 98.8 °F (37.1 °C) Axillary 104 20 92 %   12/21/17 1142 - - - - - 93 %   12/21/17 1103 - - - - - 96 %   12/21/17 0942 - - - - - 92 %   12/21/17 0717 (!) 99/48 98.6 °F (37 °C) Axillary 108 20 92 %   12/21/17 0550 - - - - - 91 %   12/21/17 0530 - 100.8 °F (38.2 °C) Axillary - - -   12/21/17 0528 - 100.1 °F (37.8 °C) Oral - - -   12/21/17 0443 - - - - - 90 %   12/21/17 0430 (!) 141/74 - - 106 - -   12/20/17 1946 104/76 99.9 °F (37.7 °C) Oral 117 22 93 %       Intake/Output Summary (Last 24 hours) at 12/21/17 1245  Last data filed at 12/21/17 1151   Gross per 24 hour   Intake              866 ml   Output             1750 ml   Net             -884 ml       No results for input(s): WBC, HGB, HCT, MCV, PLT in the last 72 hours. Recent Labs      12/20/17   2231  12/21/17   0757   NA  129*  133*   K  4.3  4.1   CL  92*  95*   CO2  21 21   BUN  17   --    CREATININE  1.23*   --        Recent Labs      12/19/17   0306   COLORU  YELLOW   PHUR  6.0   SPECGRAV  1.015   LEUKOCYTESUR  NEGATIVE   UROBILINOGEN  Normal   BILIRUBINUR  NEGATIVE       Additional Lab/culture results:    Physical Exam: Patient in bed, not in acute distress, voiding better, no bladder distention, no epididymitis or orchitis, no scrotal swelling    Interval Imaging Findings:    Impression: Acute kidney injury, chronic kidney disease   repeat BUN/creatinine a.m. Prostate cancer status post radiation therapy,   urinary retention improved.   Findings noted on the CT of the chest being monitored by pulmonary medicine  Patient Active Problem List   Diagnosis    Chest pain    Shortness of breath    Interstitial lung disease ([address]    Hypoxemia requiring supplemental oxygen       Plan: Discussed with the patient monitoring postvoid residual, discussed cystoscopy,    Servando Anderson  12:45 PM 12/21/2017

## 2017-12-21 NOTE — PROGRESS NOTES
Pulmonary Critical Care Progress Note  Reema Lemons CNP / Dr. Hussein Jacques M.D. Patient seen for the follow up of Pulmonary nodule/pulmonary fibrosis/pulmonary edema    Subjective:  He had significant increase in shortness of breath on the night last night as well as noted hypoxia with an oxygen saturation of 76% while on 2 L nasal cannula. At this time his shortness of breath has improved. He has an occasional cough now with blood-tinged sputum. He denies any chest pain. Examination:  Vitals: BP (!) 99/48   Pulse 108   Temp 98.6 °F (37 °C) (Axillary)   Resp 20   Ht 6' (1.829 m)   Wt 182 lb (82.6 kg)   SpO2 92%   BMI 24.68 kg/m²     General appearance: alert and cooperative with exam, no acute distress  Neck: No JVD  Lungs: Decreased air exchange, crackles  Heart: regular rate and rhythm, S1, S2 normal, no gallop  Abdomen: Soft, non tender, + BS  Extremities: no cyanosis or clubbing. No significant edema    LABs:  CBC: No results for input(s): WBC, HGB, HCT, PLT in the last 72 hours. BMP:   Recent Labs      12/20/17   2231  12/21/17   0757   NA  129*  133*   K  4.3  4.1   CO2  21  21   BUN  17   --    CREATININE  1.23*   --    LABGLOM  57*   --    GLUCOSE  118*   --      Radiology:      Impression:  · Acute hypoxic respiratory failure  · Spiculated right apical pulmonary nodule/fibrosis  · Pulmonary fibrosis  · Mediastinal lymphadenopathy  · Pulmonary opacity/Pulmonary edema/hemoptysis  · Fever  · 20-pack-year smoking, quit in 1978  · Arthritis  · Atrial fibrillation    Recommendations:  · Start Levaquin  · IV Lasix x 1  · Since right apical pulmonary nodule could be part of the generalized pulmonary fibrosis seen on CT scan, we will proceed with PET/CT which can be done as an outpatient.   If PET/CT is positive, we will consider CT-guided biopsy  · Add Albuterol and Ipratropium Q 4 hours and prn  · Blood Cultures/sputum cultures  · Check pro calcitonin  · X-ray chest in am  · Labs: CBC and BMP in am  · BNP  · Check ProCalcitonin  · Keep oxygen saturation greater than 92%  · PFTs which may be done as outpatient  · Home O2 evaluation prior to discharge  · DVT prophylaxis with low molecular weight heparin  · Will follow with you    Electronically signed by     James Akhtar CNP on 12/21/2017 at 10:38 AM  Patient was seen under the supervision of Dr. Hao Plaza and Sleep Medicine,    Patient seen and evaluated by me. Patient developed to increased shortness of breath because of pulmonary edema was diuresed last night and now is feeling slightly better. At this time plan is to continue bronchodilators Lasix ×1 and start Levaquin. PET/CT when available to decide about biopsy need for the right apical nodule. Above was reviewed and discussed with NP. And I agree with assessment and plan of care.   Electronically signed by     Akin Beal MD on 12/21/2017 at 12:55 PM  Pulmonary Critical Care and Sleep Medicine,  [facility]  Cell: [phone]  Office: [phone]

## 2017-12-21 NOTE — PROGRESS NOTES
Pharmacy Note  Levaquin  Consult    Sandi Garcia is a 76 y.o. male started on levaquin for pulmonary nodule/fibrosis; consult received from Dr. Bimal Wells to manage therapy. Patient Active Problem List   Diagnosis    Chest pain    Shortness of breath    Interstitial lung disease ([address]    Hypoxemia requiring supplemental oxygen       Allergies:  Review of patient's allergies indicates no known allergies. Temp max: 100.8F    Recent Labs      12/20/17   2231   BUN  17       Recent Labs      12/20/17   2231   CREATININE  1.23*       No results for input(s): WBC in the last 72 hours. Intake/Output Summary (Last 24 hours) at 12/21/17 1533  Last data filed at 12/21/17 1519   Gross per 24 hour   Intake 866 ml   Output 2075 ml   Net -1209 ml       Culture Date      Source                       Results  12/21/17 blood x 2 pending     Ht Readings from Last 1 Encounters:   12/18/17 6' (1.829 m)        Wt Readings from Last 1 Encounters:   12/18/17 182 lb (82.6 kg)         Body mass index is 24.68 kg/m². Estimated Creatinine Clearance: 53 mL/min (based on SCr of 1.23 mg/dL). Assessment/Plan:  Will initiate levaquin 500mg IV every 24 hours. Thank you for the consult. Will continue to follow.    Genesis Arroyo [facility]  12/21/2017  3:38 PM

## 2017-12-21 NOTE — PROGRESS NOTES
Dr. Nakul Murphy notified of change in patient's condition including shortness of breath and oxygen level on venti mask at 40% FiO2. Order for chest xray received and to call Dr. Ottoniel Tadeo results are bad. \"

## 2017-12-21 NOTE — PROGRESS NOTES
Patient moved from room 2016 to room 2020 to be closer to the nurse's station for patient's safety. Telesitter present in room.

## 2017-12-22 PROBLEM — R33.8 ACUTE URINARY RETENTION: Status: ACTIVE | Noted: 2017-01-01

## 2017-12-22 PROBLEM — J43.2 CENTRILOBULAR EMPHYSEMA (HCC): Status: ACTIVE | Noted: 2017-01-01

## 2017-12-22 PROBLEM — J84.10 PULMONARY FIBROSIS (HCC): Status: ACTIVE | Noted: 2017-01-01

## 2017-12-22 NOTE — PROGRESS NOTES
Seen/examined. Breathing better. Hemodynamically stable. Diminished breath sounds but no wheezing/ronchi/rales/rubs. Abdomen/Extremities unremarkable. Pul notes appreciated. Labs - negative immunologic studies. X-ray: diffuse bilateral opacities improved from previous study. Do home O2 eval. Orders entered. Discussed with patient/expressed understanding. Continuing current regimen.

## 2017-12-22 NOTE — PROGRESS NOTES
NOX held as pt is still requiring BiPAP. Will continue to monitor and complete NOX closer to discharge and when more appropriate.

## 2017-12-22 NOTE — PROGRESS NOTES
Spoke with Nelida Slater in scheduling, there is no available times today to get PET scan completed, outpatient appointment made for next Friday 12/29/17 at 11:00 am, patient needs to arrive at 10:30 am at

## 2017-12-22 NOTE — PROGRESS NOTES
Rocky Ramirez                                                                                  Urology Progress Note            Subjective: Patient seen in conjunction with nursing staff   Follow-up prostate cancer urinary retention, chronic kidney disease    Patient Vitals for the past 24 hrs:   BP Temp Temp src Pulse Resp SpO2   12/22/17 0815 - - - - - 94 %   12/22/17 0735 124/72 98.4 °F (36.9 °C) Oral 110 24 92 %   12/22/17 0500 - - - - - 95 %   12/22/17 0430 - - - - 21 95 %   12/22/17 0005 - - - - 23 96 %   12/21/17 2311 116/60 98.6 °F (37 °C) Oral 102 21 97 %   12/21/17 2020 - - - - 23 97 %   12/21/17 2012 - - - - - 97 %   12/21/17 1921 120/64 99.5 °F (37.5 °C) Oral 124 24 98 %   12/21/17 1716 - - - - 21 96 %   12/21/17 1614 (!) 112/59 100 °F (37.8 °C) Oral 110 20 92 %   12/21/17 1525 - - - - - 95 %   12/21/17 1148 105/60 98.8 °F (37.1 °C) Axillary 104 20 92 %   12/21/17 1142 - - - - - 93 %       Intake/Output Summary (Last 24 hours) at 12/22/17 1116  Last data filed at 12/22/17 1013   Gross per 24 hour   Intake              660 ml   Output             3525 ml   Net            -2865 ml       Recent Labs      12/22/17   0630   WBC  5.9   HGB  12.2*   HCT  37.5*   MCV  93.6   PLT  187     Recent Labs      12/20/17   2231  12/21/17   0757  12/22/17   0630   NA  129*  133*  137   K  4.3  4.1  3.9   CL  92*  95*  98   CO2  21  21  25   BUN  17   --   20   CREATININE  1.23*   --   0.99       No results for input(s): COLORU, PHUR, LABCAST, WBCUA, RBCUA, MUCUS, TRICHOMONAS, YEAST, BACTERIA, CLARITYU, SPECGRAV, LEUKOCYTESUR, UROBILINOGEN, BILIRUBINUR, BLOODU in the last 72 hours. Invalid input(s): NITRATE, GLUCOSEUKETONESUAMORPHOUS    Additional Lab/culture results:    Physical Exam: Patient in bed, family at bedside,   patient doing well, alert oriented, no abdominal pain no dysuria,   repeat bladder scan discussed.   Discussed cystoscopy as an outpatient, patient agreeable    We

## 2017-12-22 NOTE — PLAN OF CARE
Problem: Falls - Risk of  Goal: Absence of falls  Outcome: Met This Shift  No attempts at unassisted ambulation, needs met with rounds and call light, no injuries this shift    Problem: Gas Exchange - Impaired:  Goal: Levels of oxygenation will improve  Levels of oxygenation will improve  Outcome: Ongoing  Pt cont on BiPAP at 35% FiO2, saturation 95-98%, lungs as charted, pt desaturates to 84% on room air, as shift progressed and pt taken off BiPAP for fluid, oxygenation was 88-90%, pt cont to be alert, and tolerating tx

## 2017-12-23 PROBLEM — J96.21 ACUTE ON CHRONIC RESPIRATORY FAILURE WITH HYPOXIA (HCC): Status: ACTIVE | Noted: 2017-01-01

## 2017-12-23 NOTE — CARE COORDINATION
Pt qualifies for home 02. Script written for leonidas e02 4 L cont. Called and spoke with Eduardo Freitas with [facility] [place] for delivery. Eduardo Freitas will be here in 20 minute with delivery of portable tank. Demos, script, home 02 blaise and progress notes faxed to Jihan Juan. RN informed.

## 2017-12-23 NOTE — PROGRESS NOTES
recommendations, safety with mob and transfers, pacing, and breathing techs  Discharge Recommendations: Home with Home health OT; Home with assist PRN  REQUIRES OT FOLLOW UP: Yes  Activity Tolerance  Activity Tolerance: Patient Tolerated treatment well  Safety Devices  Safety Devices in place: Yes  Type of devices: Nurse notified; Left in chair;Call light within reach;Gait belt;Patient at risk for falls        Discharge Recommendations:  Home with Home health OT, Home with assist PRN     Plan   Plan  Times per week: 4-5x/week, 1-2x/day  Current Treatment Recommendations: Strengthening, Balance Training, Functional Mobility Training, Endurance Training, Self-Care / ADL, Safety Education & Training, Patient/Caregiver Education & Training, Equipment Evaluation, Education, & procurement    G-Code  OT G-codes  Functional Assessment Tool Used: Amna Fatima \"6 clicks\" Inpatient daily activity short form  Score: 19  Functional Limitation: Self care  Self Care Current Status ():  At least 40 percent but less than 60 percent impaired, limited or restricted  Self Care Goal Status (): 0 percent impaired, limited or restricted  OutComes Score                                           AM-PAC Score        AM-[facility] Inpatient Daily Activity Raw Score: 19  AM-PAC Inpatient ADL T-Scale Score : 40.22  ADL Inpatient CMS 0-100% Score: 42.8  ADL Inpatient CMS G-Code Modifier : CK    Goals  Short term goals  Time Frame for Short term goals: by discharge, pt will  Short term goal 1: demo S/I with ADL transfers with good safety  Short term goal 2: demo S/I with functional mob for ADL completion with good safety  Short term goal 3: demo S/I with toileting routine with good safety  Short term goal 4: demo S/I with UB/LB ADLs with good safety and pacing  Short term goal 5: verb good understanding of fall prevention techs, EC/WS techs, and possible equip needs  Patient Goals   Patient goals : to go home       Therapy Time   Individual Concurrent Group Co-treatment   Time In 1003         Time Out 1         Minutes 22               Pt would benefit from skilled home OT services in order to maximize occupational performance, safety, and independence in the home environment.    Hieu Liriano, OT

## 2017-12-23 NOTE — PROGRESS NOTES
Physical Therapy    Facility/Department: [facility] PROGRESSIVE CARE  Initial Assessment    NAME: Sayda Nurse  : 1940  MRN: [id number]  Discharge Recommendation:   Home with assist PRN, Home with Home health PT  Date of Service: 2017  Per H&P: 76year old male seen in the office on  for follow-up on his EMG (extremities) results (peripheral neuropathy). At that time he also had an additional complaint of cough and a low grade fever. He was diagnosed with bronchitis, prescribed  Ceftin for a week with good response. He was seen back  in the office on Dec 14 with a one day history of hoarseness of his voice There was no stridor. He had hoarse breath sounds but there were no ronchi, rales or friction rubs. He did complaint of exertional dyspnea. He presented to the office today with increasing dyspnea on exertion which on further questioning he stated that he had been noticing this for over two months. No pleuritic chest pains or wheezing, PND, orthopnea, palpitations, or precordial chest pains  Patient Diagnosis(es): There were no encounter diagnoses. has a past medical history of Arthritis of both knees; Atrial fibrillation ([address] Cancer ([address] Chest pain; History of cardiac cath; Hx MRSA infection; and Sweating. has a past surgical history that includes Appendectomy; Tonsillectomy; and joint replacement (Bilateral).     Restrictions  Restrictions/Precautions  Restrictions/Precautions: General Precautions, Fall Risk  Position Activity Restriction  Other position/activity restrictions: 02 2L; dropped to 88% without O2 in room  Vision/Hearing        Subjective  General  Chart Reviewed: Yes  Patient assessed for rehabilitation services?: Yes  Additional Pertinent Hx: a-fib, OA  Response To Previous Treatment: Not applicable  Family / Caregiver Present: No  Diagnosis: Interstitial lung disease, hypoxemia  Follows Commands: Within Functional Limits  General Comment  Comments: Silvana Link RN reports patient appropriate for PT. Subjective  Subjective: Patient pleasant and agreeable to PT.   Pain Screening  Patient Currently in Pain: Denies  Vital Signs  Patient Currently in Pain: Denies  Pre Treatment Pain Screening  Intervention List: Patient able to continue with treatment    Orientation  Orientation  Overall Orientation Status: Within Normal Limits    Social/Functional History  Social/Functional History  Lives With: Alone  Type of Home: Mobile home  Home Layout: One level  Home Access: Stairs to enter with rails  Entrance Stairs - Number of Steps: 4  Entrance Stairs - Rails: Both  Bathroom Shower/Tub: Walk-in shower  Bathroom Toilet: Standard  Bathroom Equipment: Shower chair  ADL Assistance: Independent  Homemaking Assistance: Independent  Homemaking Responsibilities: Yes  Ambulation Assistance: Independent  Transfer Assistance: Independent  Active : Yes  Mode of Transportation: Car  Objective          AROM RLE (degrees)  RLE AROM: WFL  AROM LLE (degrees)  LLE AROM : WFL  AROM RUE (degrees)  RUE AROM : WFL  AROM LUE (degrees)  LUE AROM : WFL  Strength RLE  Comment: 4/5  Strength LLE  Comment: 4/5  Strength RUE  Comment: See OT assessment  Strength LUE  Comment: See OT assessment  Tone RLE  RLE Tone: Normotonic  Tone LLE  LLE Tone: Normotonic  Motor Control  Gross Motor?: WFL        Transfers  Sit to Stand: Contact guard assistance  Stand to sit: Contact guard assistance  Bed to Chair: Contact guard assistance  Stand Pivot Transfers: Contact guard assistance  Lateral Transfers: Contact guard assistance  Ambulation  Ambulation?: Yes  Ambulation 1  Surface: level tile  Device: No Device  Assistance: Contact guard assistance  Distance: 80 feet x 2  Comments: SP02 92-96% with ambulation on 2 liters 02      Balance  Posture: Good  Sitting - Static: Good  Sitting - Dynamic: Good  Standing - Static: Fair;+  Standing - Dynamic: Fair;+        Assessment   Body structures, Functions, Activity limitations: Decreased functional mobility ; Decreased ADL status; Decreased strength;Decreased endurance;Decreased balance  Assessment: Patient requires 02 as SP02 drops to 88% sitting EOB without 02. Recommend home with home health. Prognosis: Good  Decision Making: Low Complexity  History: Interstitial lung disease, hypoxemia,   Exam: ROM, strength, balance, gait, [facility]  Clinical Presentation: stable  Patient Education: Patient educated to PT POC, discharge recommendations, and need for 02 at this time due to drop in SP02 without 02.  REQUIRES PT FOLLOW UP: Yes  Activity Tolerance  Activity Tolerance: Patient Tolerated treatment well  PT Equipment Recommendations  Equipment Needed: No     Discharge Recommendations:  Home with assist PRN, Home with Home health PT      Plan   Plan  Times per week: 1-2x/d, 5-6d/wk  Current Treatment Recommendations: Strengthening, Balance Training, Functional Mobility Training, Transfer Training, Endurance Training, Gait Training, Home Exercise Program, Safety Education & Training, Patient/Caregiver Education & Training, Stair training  Safety Devices  Type of devices: All fall risk precautions in place, Call light within reach, Gait belt, Left in chair, Nurse notified    G-Code  PT G-Codes  Functional Assessment Tool Used: [facility] without steps  Score: 17/20  Functional Limitation: Mobility: Walking and moving around  Mobility: Walking and Moving Around Current Status (): At least 20 percent but less than 40 percent impaired, limited or restricted  Mobility: Walking and Moving Around Goal Status ():  At least 1 percent but less than 20 percent impaired, limited or restricted    AM-PAC Score     AM-PAC Inpatient Mobility without Stair Climbing Raw Score : 17  AM-PAC Inpatient without Stair Climbing T-Scale Score : 48.47  Mobility Inpatient CMS 0-100% Score: 32.72  Mobility Inpatient without Stair CMS G-Code Modifier : CJ       Goals  Short term goals  Time Frame for Short

## 2017-12-23 NOTE — PROGRESS NOTES
Pulmonary Critical Care Progress Note       Patient seen for the follow up of Pulmonary nodule/pulmonary fibrosis/pulmonary edema    Subjective:    He has remains  afebrile. He requires O2 on O2 evaluation. He has have a cough with of blood-tinged sputum. He denies chest pain. He has periodic shortness of breath. Examination:  Vitals: BP (!) 102/51   Pulse 82   Temp 97.5 °F (36.4 °C) (Oral)   Resp 16   Ht 6' (1.829 m)   Wt 182 lb (82.6 kg)   SpO2 93%   BMI 24.68 kg/m²     General appearance: alert and cooperative with exam, no acute distress  Neck: No JVD  Lungs: Decreased air exchange, + crackles  Heart: regular rate and rhythm, S1, S2 normal, no gallop  Abdomen: Soft, non tender, + BS  Extremities: no cyanosis or clubbing. No significant edema    LABs:  CBC:   Recent Labs      12/22/17   0630   WBC  5.9   HGB  12.2*   HCT  37.5*   PLT  187     BMP:   Recent Labs      12/20/17   2231  12/21/17   0757  12/22/17   0630   NA  129*  133*  137   K  4.3  4.1  3.9   CO2  21  21  25   BUN  17   --   20   CREATININE  1.23*   --   0.99   LABGLOM  57*   --   >60   GLUCOSE  118*   --   79     Procalcitonin 0.14   <0.09 ng/mL     Pro-BNP <300 pg/mL 5,220         Radiology:        CT chest 12/18  Spiculated pleural-parenchymal lesion right apex.  Patchy ground-glass   opacities bilaterally.  Pathologic mediastinal lymphadenopathy.  Paraseptal   and centrilobular emphysema.  Small bilateral pleural reactions or pleural   effusions.  Differential considerations include interstitial lung disease and   pulmonary fibrosis as well as superimposed infection or neoplasm.  Follow-up   PET-CT recommended.          Impression:    · Acute hypoxic respiratory failure  · Spiculated right apical pulmonary nodule  · Pneumonia/mild hemoptysis  · Pulmonary fibrosis  · Mediastinal lymphadenopathy  · 20-pack-year smoking, quit in 1978  · Atrial fibrillation    Recommendations:    · Arrnange home O2  · Off Albuterol and Ipratropium Q 4 hours and prn  · Levaquin PO finish course  · PFTs  as outpatient  · if PET/CT is positive, we will consider CT-guided biopsy  · Discussed with Dr Aron Johnson  · Loli Record to discharge      Corrine Morales MD on 12/23/2017 at 2:37 PM  Pulmonary Critical Care and Sleep Medicine,  [facility] [place]: [phone]

## 2017-12-23 NOTE — DISCHARGE SUMMARY
Medication Information                      aspirin 81 MG tablet  Take 81 mg by mouth daily. diltiazem (CARDIZEM) 30 MG tablet  Take 1 tablet by mouth daily             gabapentin (NEURONTIN) 100 MG capsule  Take 100 mg by mouth 3 times daily             levofloxacin (LEVAQUIN) 500 MG tablet  Take 1 tablet by mouth daily for 10 days             mometasone-formoterol (DULERA) 100-5 MCG/ACT inhaler  Inhale 2 puffs into the lungs 2 times daily             tamsulosin (FLOMAX) 0.4 MG capsule  Take 1 capsule by mouth daily                  Activity: activity as tolerated    Diet: regular diet    Time Spent on discharge is more than 30 minutes in the examination, evaluation, counseling and review of medications and discharge plan.       Electronically signed by Ru Calle MD on 12/23/2017 at 3:40 PM

## 2017-12-23 NOTE — FLOWSHEET NOTE
Call received from [facility] [place] with [address], states that he has notified  of need for oxygen, portable to be delivered

## 2017-12-23 NOTE — PROGRESS NOTES
Home Oxygen Evaluation    Home Oxygen Evaluation completed. Patient is on 4 liters per minute via nasal cannula SpO2 94%. Resting SpO2 on room air = 82%    Nocturnal Oximetry with patient on room air is recommended is SpO2 is between 89% and 95% (requires additional order).     Gerre Mitchell  2:33 PM

## 2017-12-23 NOTE — PLAN OF CARE
Problem: Gas Exchange - Impaired:  Intervention: Assess risk for impaired gas exchange  Continuous pulse ox in place  Intervention: Assess signs and symptoms of impaired gas exchange  Dyspnea noted with activity, denies shortness of breath with rest  Intervention: Distraction activities  Diversion activities implemented and patient educated on activity pacing  Intervention: Promote ambulation early  Positions self for optimal air exchange    Goal: Levels of oxygenation will improve  Levels of oxygenation will improve   Outcome: Ongoing  Patient continues to need supplemental oxygenation, titrated to maintain Spo2 > 90%, will continue to monitor and educate as needed

## 2018-01-01 ENCOUNTER — APPOINTMENT (OUTPATIENT)
Dept: CT IMAGING | Age: 78
End: 2018-01-01
Payer: MEDICARE

## 2018-01-01 ENCOUNTER — APPOINTMENT (OUTPATIENT)
Dept: GENERAL RADIOLOGY | Age: 78
DRG: 280 | End: 2018-01-01
Payer: MEDICARE

## 2018-01-01 ENCOUNTER — APPOINTMENT (OUTPATIENT)
Dept: GENERAL RADIOLOGY | Age: 78
End: 2018-01-01
Payer: MEDICARE

## 2018-01-01 ENCOUNTER — APPOINTMENT (OUTPATIENT)
Dept: CARDIAC CATH/INVASIVE PROCEDURES | Age: 78
DRG: 280 | End: 2018-01-01
Payer: MEDICARE

## 2018-01-01 ENCOUNTER — HOSPITAL ENCOUNTER (EMERGENCY)
Age: 78
End: 2018-03-30
Attending: EMERGENCY MEDICINE | Admitting: INTERNAL MEDICINE
Payer: MEDICARE

## 2018-01-01 ENCOUNTER — HOSPITAL ENCOUNTER (OUTPATIENT)
Dept: NUCLEAR MEDICINE | Age: 78
Discharge: HOME | End: 2018-01-05
Payer: MEDICARE

## 2018-01-01 ENCOUNTER — HOSPITAL ENCOUNTER (INPATIENT)
Age: 78
LOS: 6 days | Discharge: HOME | DRG: 280 | End: 2018-02-07
Attending: EMERGENCY MEDICINE | Admitting: INTERNAL MEDICINE
Payer: MEDICARE

## 2018-01-01 ENCOUNTER — HOSPITAL ENCOUNTER (OUTPATIENT)
Age: 78
Discharge: HOME | DRG: 280 | End: 2018-02-01
Payer: MEDICARE

## 2018-01-01 VITALS
SYSTOLIC BLOOD PRESSURE: 133 MMHG | DIASTOLIC BLOOD PRESSURE: 79 MMHG | WEIGHT: 180.78 LBS | RESPIRATION RATE: 42 BRPM | BODY MASS INDEX: 25.94 KG/M2 | TEMPERATURE: 95 F | HEART RATE: 106 BPM | OXYGEN SATURATION: 100 %

## 2018-01-01 VITALS
RESPIRATION RATE: 16 BRPM | OXYGEN SATURATION: 100 % | HEIGHT: 70 IN | HEART RATE: 82 BPM | DIASTOLIC BLOOD PRESSURE: 71 MMHG | SYSTOLIC BLOOD PRESSURE: 123 MMHG | BODY MASS INDEX: 25.96 KG/M2 | WEIGHT: 181.3 LBS | TEMPERATURE: 97.6 F

## 2018-01-01 DIAGNOSIS — I46.9 CARDIAC ARREST (HCC): Primary | ICD-10-CM

## 2018-01-01 DIAGNOSIS — I50.9 CONGESTIVE HEART FAILURE, UNSPECIFIED CONGESTIVE HEART FAILURE CHRONICITY, UNSPECIFIED CONGESTIVE HEART FAILURE TYPE: Primary | ICD-10-CM

## 2018-01-01 DIAGNOSIS — J84.10 PULMONARY FIBROSIS (HCC): ICD-10-CM

## 2018-01-01 LAB
-: NORMAL
ABSOLUTE EOS #: 0.07 K/UL (ref 0–0.44)
ABSOLUTE EOS #: 0.1 K/UL (ref 0–0.4)
ABSOLUTE EOS #: 0.1 K/UL (ref 0–0.4)
ABSOLUTE EOS #: 0.2 K/UL (ref 0–0.4)
ABSOLUTE IMMATURE GRANULOCYTE: 0.16 K/UL (ref 0–0.3)
ABSOLUTE IMMATURE GRANULOCYTE: ABNORMAL K/UL (ref 0–0.3)
ABSOLUTE LYMPH #: 2.3 K/UL (ref 1–4.8)
ABSOLUTE LYMPH #: 2.3 K/UL (ref 1–4.8)
ABSOLUTE LYMPH #: 2.4 K/UL (ref 1–4.8)
ABSOLUTE LYMPH #: 4.23 K/UL (ref 1.1–3.7)
ABSOLUTE MONO #: 0.5 K/UL (ref 0.1–1.2)
ABSOLUTE MONO #: 0.7 K/UL (ref 0.2–0.8)
ABSOLUTE MONO #: 0.8 K/UL (ref 0.2–0.8)
ABSOLUTE MONO #: 0.9 K/UL (ref 0.2–0.8)
ABSOLUTE RETIC #: 0.07 M/UL (ref 0.02–0.1)
ALBUMIN (CALCULATED): 3.5 G/DL (ref 3.2–5.2)
ALBUMIN PERCENT: 48 % (ref 45–65)
ALBUMIN SERPL-MCNC: 2.6 G/DL (ref 3.5–5.2)
ALBUMIN/GLOBULIN RATIO: 0.6 (ref 1–2.5)
ALLEN TEST: ABNORMAL
ALLEN TEST: POSITIVE
ALP BLD-CCNC: 86 U/L (ref 40–129)
ALPHA 1 PERCENT: 3 % (ref 3–6)
ALPHA 2 PERCENT: 9 % (ref 6–13)
ALPHA-1-GLOBULIN: 0.2 G/DL (ref 0.1–0.4)
ALPHA-2-GLOBULIN: 0.6 G/DL (ref 0.5–0.9)
ALT SERPL-CCNC: 28 U/L (ref 5–41)
AMORPHOUS: NORMAL
ANION GAP SERPL CALCULATED.3IONS-SCNC: 14 MMOL/L (ref 9–17)
ANION GAP SERPL CALCULATED.3IONS-SCNC: 15 MMOL/L (ref 9–17)
ANION GAP SERPL CALCULATED.3IONS-SCNC: 27 MMOL/L (ref 9–17)
ANION GAP: 16 MMOL/L (ref 7–16)
ANTI-NUCLEAR ANTIBODY (ANA): NEGATIVE
AST SERPL-CCNC: 45 U/L
BACTERIA: NORMAL
BASOPHILS # BLD: 0 % (ref 0–2)
BASOPHILS # BLD: 0 % (ref 0–2)
BASOPHILS # BLD: 1 % (ref 0–2)
BASOPHILS # BLD: 1 % (ref 0–2)
BASOPHILS ABSOLUTE: 0 K/UL (ref 0–0.2)
BASOPHILS ABSOLUTE: <0.03 K/UL (ref 0–0.2)
BETA GLOBULIN: 2.4 G/DL (ref 0.5–1.1)
BETA PERCENT: 33 % (ref 11–19)
BILIRUB SERPL-MCNC: 1.87 MG/DL (ref 0.3–1.2)
BILIRUBIN DIRECT: 0.69 MG/DL
BILIRUBIN URINE: NEGATIVE
BILIRUBIN URINE: NEGATIVE
BILIRUBIN, INDIRECT: 1.18 MG/DL (ref 0–1)
BNP INTERPRETATION: ABNORMAL
BUN BLDV-MCNC: 17 MG/DL (ref 8–23)
BUN BLDV-MCNC: 18 MG/DL (ref 8–23)
BUN BLDV-MCNC: 21 MG/DL (ref 8–23)
BUN BLDV-MCNC: 22 MG/DL (ref 8–23)
BUN BLDV-MCNC: 23 MG/DL (ref 8–23)
BUN BLDV-MCNC: 25 MG/DL (ref 8–23)
BUN BLDV-MCNC: 35 MG/DL (ref 8–23)
BUN/CREAT BLD: 18 (ref 9–20)
BUN/CREAT BLD: 21 (ref 9–20)
BUN/CREAT BLD: 23 (ref 9–20)
BUN/CREAT BLD: 23 (ref 9–20)
BUN/CREAT BLD: ABNORMAL (ref 9–20)
CALCIUM IONIZED: 1.31 MMOL/L (ref 1.13–1.33)
CALCIUM SERPL-MCNC: 8.5 MG/DL (ref 8.6–10.4)
CALCIUM SERPL-MCNC: 8.9 MG/DL (ref 8.6–10.4)
CALCIUM SERPL-MCNC: 9 MG/DL (ref 8.6–10.4)
CALCIUM SERPL-MCNC: 9.3 MG/DL (ref 8.6–10.4)
CALCIUM SERPL-MCNC: 9.4 MG/DL (ref 8.6–10.4)
CASTS UA: NORMAL /LPF (ref 0–2)
CHLORIDE BLD-SCNC: 100 MMOL/L (ref 98–107)
CHLORIDE BLD-SCNC: 100 MMOL/L (ref 98–107)
CHLORIDE BLD-SCNC: 101 MMOL/L (ref 98–107)
CHLORIDE BLD-SCNC: 84 MMOL/L (ref 98–107)
CHLORIDE BLD-SCNC: 96 MMOL/L (ref 98–107)
CHLORIDE BLD-SCNC: 98 MMOL/L (ref 98–107)
CHLORIDE BLD-SCNC: 99 MMOL/L (ref 98–107)
CHOLESTEROL/HDL RATIO: 4.8
CHOLESTEROL: 105 MG/DL
CO2: 22 MMOL/L (ref 20–31)
CO2: 22 MMOL/L (ref 20–31)
CO2: 23 MMOL/L (ref 20–31)
CO2: 23 MMOL/L (ref 20–31)
CO2: 24 MMOL/L (ref 20–31)
CO2: 25 MMOL/L (ref 20–31)
CO2: 26 MMOL/L (ref 20–31)
COLOR: YELLOW
COLOR: YELLOW
COMMENT UA: ABNORMAL
COMMENT UA: NORMAL
CREAT SERPL-MCNC: 0.87 MG/DL (ref 0.7–1.2)
CREAT SERPL-MCNC: 0.96 MG/DL (ref 0.7–1.2)
CREAT SERPL-MCNC: 0.97 MG/DL (ref 0.7–1.2)
CREAT SERPL-MCNC: 0.98 MG/DL (ref 0.7–1.2)
CREAT SERPL-MCNC: 1.02 MG/DL (ref 0.7–1.2)
CREAT SERPL-MCNC: 1.2 MG/DL (ref 0.7–1.2)
CREAT SERPL-MCNC: 1.59 MG/DL (ref 0.7–1.2)
CRYSTALS, UA: NORMAL /HPF
D-DIMER QUANTITATIVE: 0.64 MG/L FEU
DATE, STOOL #1: NORMAL
DATE, STOOL #2: NORMAL
DATE, STOOL #3: NORMAL
DIFFERENTIAL TYPE: ABNORMAL
EKG ATRIAL RATE: 94 BPM
EKG ATRIAL RATE: 94 BPM
EKG P AXIS: 27 DEGREES
EKG P-R INTERVAL: 148 MS
EKG Q-T INTERVAL: 370 MS
EKG Q-T INTERVAL: 384 MS
EKG QRS DURATION: 114 MS
EKG QRS DURATION: 96 MS
EKG QTC CALCULATION (BAZETT): 480 MS
EKG QTC CALCULATION (BAZETT): 484 MS
EKG R AXIS: 109 DEGREES
EKG R AXIS: 92 DEGREES
EKG T AXIS: -87 DEGREES
EKG T AXIS: -88 DEGREES
EKG VENTRICULAR RATE: 103 BPM
EKG VENTRICULAR RATE: 94 BPM
EOSINOPHILS RELATIVE PERCENT: 1 % (ref 1–4)
EOSINOPHILS RELATIVE PERCENT: 2 % (ref 1–4)
EPITHELIAL CELLS UA: NORMAL /HPF (ref 0–5)
FERRITIN: 544 UG/L (ref 30–400)
FIO2: 100
FIO2: ABNORMAL
FOLATE: 14.3 NG/ML
GAMMA GLOBULIN %: 8 % (ref 9–20)
GAMMA GLOBULIN: 0.6 G/DL (ref 0.5–1.5)
GFR AFRICAN AMERICAN: 51 ML/MIN
GFR AFRICAN AMERICAN: >60 ML/MIN
GFR NON-AFRICAN AMERICAN: 33 ML/MIN
GFR NON-AFRICAN AMERICAN: 42 ML/MIN
GFR NON-AFRICAN AMERICAN: 58 ML/MIN
GFR NON-AFRICAN AMERICAN: >60 ML/MIN
GFR SERPL CREATININE-BSD FRML MDRD: 39 ML/MIN
GFR SERPL CREATININE-BSD FRML MDRD: ABNORMAL ML/MIN/{1.73_M2}
GLOBULIN: ABNORMAL G/DL (ref 1.5–3.8)
GLUCOSE BLD-MCNC: 106 MG/DL (ref 70–99)
GLUCOSE BLD-MCNC: 156 MG/DL (ref 74–100)
GLUCOSE BLD-MCNC: 166 MG/DL (ref 70–99)
GLUCOSE BLD-MCNC: 80 MG/DL (ref 70–99)
GLUCOSE BLD-MCNC: 83 MG/DL (ref 70–99)
GLUCOSE BLD-MCNC: 93 MG/DL (ref 70–99)
GLUCOSE BLD-MCNC: 95 MG/DL (ref 70–99)
GLUCOSE BLD-MCNC: 98 MG/DL (ref 70–99)
GLUCOSE URINE: NEGATIVE
GLUCOSE URINE: NEGATIVE
HAPTOGLOBIN: 125 MG/DL (ref 30–200)
HCO3 VENOUS: 29.7 MMOL/L (ref 22–29)
HCT VFR BLD CALC: 28.9 % (ref 41–53)
HCT VFR BLD CALC: 29 % (ref 41–53)
HCT VFR BLD CALC: 30.9 % (ref 41–53)
HCT VFR BLD CALC: 37.6 % (ref 40.7–50.3)
HDLC SERPL-MCNC: 22 MG/DL
HEMOCCULT SP1 STL QL: NEGATIVE
HEMOCCULT SP2 STL QL: NORMAL
HEMOCCULT SP3 STL QL: NORMAL
HEMOGLOBIN: 10.1 G/DL (ref 13.5–17.5)
HEMOGLOBIN: 11.7 G/DL (ref 13–17)
HEMOGLOBIN: 9.6 G/DL (ref 13.5–17.5)
HEMOGLOBIN: 9.6 G/DL (ref 13.5–17.5)
IMMATURE GRANULOCYTES: 2 %
IMMATURE GRANULOCYTES: ABNORMAL %
IMMATURE RETIC FRACT: ABNORMAL %
IRON SATURATION: 12 % (ref 20–55)
IRON: 26 UG/DL (ref 59–158)
KETONES, URINE: NEGATIVE
KETONES, URINE: NEGATIVE
LDL CHOLESTEROL: 67 MG/DL (ref 0–130)
LEUKOCYTE ESTERASE, URINE: NEGATIVE
LEUKOCYTE ESTERASE, URINE: NEGATIVE
LIPASE: 89 U/L (ref 13–60)
LYMPHOCYTES # BLD: 26 % (ref 24–44)
LYMPHOCYTES # BLD: 26 % (ref 24–44)
LYMPHOCYTES # BLD: 33 % (ref 24–44)
LYMPHOCYTES # BLD: 40 % (ref 24–43)
MAGNESIUM: 2.1 MG/DL (ref 1.6–2.6)
MAGNESIUM: 2.1 MG/DL (ref 1.6–2.6)
MCH RBC QN AUTO: 29.9 PG (ref 25.2–33.5)
MCH RBC QN AUTO: 29.9 PG (ref 26–34)
MCH RBC QN AUTO: 30.2 PG (ref 26–34)
MCH RBC QN AUTO: 30.4 PG (ref 26–34)
MCHC RBC AUTO-ENTMCNC: 31.1 G/DL (ref 28.4–34.8)
MCHC RBC AUTO-ENTMCNC: 32.8 G/DL (ref 31–37)
MCHC RBC AUTO-ENTMCNC: 33.3 G/DL (ref 31–37)
MCHC RBC AUTO-ENTMCNC: 33.3 G/DL (ref 31–37)
MCV RBC AUTO: 90.7 FL (ref 80–100)
MCV RBC AUTO: 91.2 FL (ref 80–100)
MCV RBC AUTO: 91.2 FL (ref 80–100)
MCV RBC AUTO: 96.2 FL (ref 82.6–102.9)
MODE: ABNORMAL
MODE: ABNORMAL
MONOCYTES # BLD: 10 % (ref 1–7)
MONOCYTES # BLD: 10 % (ref 1–7)
MONOCYTES # BLD: 5 % (ref 3–12)
MONOCYTES # BLD: 8 % (ref 1–7)
MUCUS: NORMAL
MYOGLOBIN: 104 NG/ML (ref 28–72)
MYOGLOBIN: 107 NG/ML (ref 28–72)
MYOGLOBIN: 125 NG/ML (ref 28–72)
MYOGLOBIN: 140 NG/ML (ref 28–72)
MYOGLOBIN: 141 NG/ML (ref 28–72)
MYOGLOBIN: 141 NG/ML (ref 28–72)
MYOGLOBIN: 147 NG/ML (ref 28–72)
MYOGLOBIN: 96 NG/ML (ref 28–72)
NEGATIVE BASE EXCESS, ART: ABNORMAL (ref 0–2)
NEGATIVE BASE EXCESS, VEN: ABNORMAL (ref 0–2)
NITRITE, URINE: NEGATIVE
NITRITE, URINE: NEGATIVE
NRBC AUTOMATED: 0 PER 100 WBC
NRBC AUTOMATED: ABNORMAL PER 100 WBC
O2 DEVICE/FLOW/%: ABNORMAL
O2 DEVICE/FLOW/%: ABNORMAL
O2 SAT, VEN: 87 % (ref 60–85)
OTHER OBSERVATIONS UA: NORMAL
PARTIAL THROMBOPLASTIN TIME: 25.8 SEC (ref 20.5–30.5)
PATHOLOGIST: ABNORMAL
PATHOLOGIST: NORMAL
PATIENT TEMP: ABNORMAL
PATIENT TEMP: ABNORMAL
PCO2, VEN: 63.1 MM HG (ref 41–51)
PDW BLD-RTO: 15.1 % (ref 11.5–14.5)
PDW BLD-RTO: 15.4 % (ref 11.5–14.5)
PDW BLD-RTO: 15.6 % (ref 11.5–14.5)
PDW BLD-RTO: 18.9 % (ref 11.8–14.4)
PH UA: 5.5 (ref 5–8)
PH UA: 6.5 (ref 5–8)
PH VENOUS: 7.28 (ref 7.32–7.43)
PLATELET # BLD: 113 K/UL (ref 138–453)
PLATELET # BLD: 219 K/UL (ref 130–400)
PLATELET # BLD: 223 K/UL (ref 130–400)
PLATELET # BLD: 247 K/UL (ref 130–400)
PLATELET ESTIMATE: ABNORMAL
PMV BLD AUTO: 11.9 FL (ref 8.1–13.5)
PMV BLD AUTO: 8.3 FL (ref 6–12)
PMV BLD AUTO: 8.5 FL (ref 6–12)
PMV BLD AUTO: 8.7 FL (ref 6–12)
PO2, VEN: 61.7 MM HG (ref 30–50)
POC CHLORIDE: 88 MMOL/L (ref 98–107)
POC CREATININE: 1.99 MG/DL (ref 0.51–1.19)
POC HCO3: 30.2 MMOL/L (ref 21–28)
POC HEMATOCRIT: 38 % (ref 41–53)
POC HEMOGLOBIN: 13 G/DL (ref 13.5–17.5)
POC IONIZED CALCIUM: 1.03 MMOL/L (ref 1.15–1.33)
POC LACTIC ACID: 9.05 MMOL/L (ref 0.56–1.39)
POC O2 SATURATION: 100 % (ref 94–98)
POC PCO2 TEMP: ABNORMAL MM HG
POC PCO2 TEMP: ABNORMAL MM HG
POC PCO2: 42.6 MM HG (ref 35–48)
POC PH TEMP: ABNORMAL
POC PH TEMP: ABNORMAL
POC PH: 7.46 (ref 7.35–7.45)
POC PO2 TEMP: ABNORMAL MM HG
POC PO2 TEMP: ABNORMAL MM HG
POC PO2: 438.1 MM HG (ref 83–108)
POC POTASSIUM: 2.5 MMOL/L (ref 3.5–4.5)
POC SODIUM: 134 MMOL/L (ref 138–146)
POC TROPONIN I: 0.54 NG/ML (ref 0–0.1)
POC TROPONIN INTERP: ABNORMAL
POSITIVE BASE EXCESS, ART: 6 (ref 0–3)
POSITIVE BASE EXCESS, VEN: 2 (ref 0–3)
POTASSIUM SERPL-SCNC: 3 MMOL/L (ref 3.7–5.3)
POTASSIUM SERPL-SCNC: 3.5 MMOL/L (ref 3.7–5.3)
POTASSIUM SERPL-SCNC: 3.6 MMOL/L (ref 3.7–5.3)
POTASSIUM SERPL-SCNC: 3.7 MMOL/L (ref 3.7–5.3)
POTASSIUM SERPL-SCNC: 3.9 MMOL/L (ref 3.7–5.3)
POTASSIUM SERPL-SCNC: 4.4 MMOL/L (ref 3.7–5.3)
POTASSIUM SERPL-SCNC: 4.6 MMOL/L (ref 3.7–5.3)
PRO-BNP: 4838 PG/ML
PRO-BNP: 7217 PG/ML
PRO-BNP: 8151 PG/ML
PRO-BNP: ABNORMAL PG/ML
PROTEIN ELECTROPHORESIS, SERUM: ABNORMAL
PROTEIN UA: ABNORMAL
PROTEIN UA: NEGATIVE
RBC # BLD: 3.18 M/UL (ref 4.5–5.9)
RBC # BLD: 3.19 M/UL (ref 4.5–5.9)
RBC # BLD: 3.39 M/UL (ref 4.5–5.9)
RBC # BLD: 3.91 M/UL (ref 4.21–5.77)
RBC # BLD: ABNORMAL 10*6/UL
RBC UA: NORMAL /HPF (ref 0–2)
RENAL EPITHELIAL, UA: NORMAL /HPF
RETIC %: 2.3 % (ref 0.5–2)
RETIC HEMOGLOBIN: ABNORMAL PG (ref 28.2–35.7)
SAMPLE SITE: ABNORMAL
SAMPLE SITE: ABNORMAL
SEG NEUTROPHILS: 52 % (ref 36–65)
SEG NEUTROPHILS: 54 % (ref 36–66)
SEG NEUTROPHILS: 61 % (ref 36–66)
SEG NEUTROPHILS: 64 % (ref 36–66)
SEGMENTED NEUTROPHILS ABSOLUTE COUNT: 3.9 K/UL (ref 1.8–7.7)
SEGMENTED NEUTROPHILS ABSOLUTE COUNT: 5.4 K/UL (ref 1.8–7.7)
SEGMENTED NEUTROPHILS ABSOLUTE COUNT: 5.6 K/UL (ref 1.5–8.1)
SEGMENTED NEUTROPHILS ABSOLUTE COUNT: 5.8 K/UL (ref 1.8–7.7)
SERUM IFX INTERP: NORMAL
SODIUM BLD-SCNC: 136 MMOL/L (ref 135–144)
SODIUM BLD-SCNC: 136 MMOL/L (ref 135–144)
SODIUM BLD-SCNC: 137 MMOL/L (ref 135–144)
SODIUM BLD-SCNC: 139 MMOL/L (ref 135–144)
SPECIFIC GRAVITY UA: 1.01 (ref 1–1.03)
SPECIFIC GRAVITY UA: 1.01 (ref 1–1.03)
TCO2 (CALC), ART: 32 MMOL/L (ref 22–29)
TIME, STOOL #1: 2301
TIME, STOOL #2: NORMAL
TIME, STOOL #3: NORMAL
TOTAL CO2, VENOUS: 32 MMOL/L (ref 23–30)
TOTAL IRON BINDING CAPACITY: 225 UG/DL (ref 250–450)
TOTAL PROT. SUM,%: 101 % (ref 98–102)
TOTAL PROT. SUM: 7.3 G/DL (ref 6.3–8.2)
TOTAL PROTEIN: 7.3 G/DL (ref 6.4–8.3)
TOTAL PROTEIN: 7.3 G/DL (ref 6.4–8.3)
TRICHOMONAS: NORMAL
TRIGL SERPL-MCNC: 80 MG/DL
TROPONIN INTERP: ABNORMAL
TROPONIN T: 0.03 NG/ML
TROPONIN T: 0.04 NG/ML
TROPONIN T: 0.21 NG/ML
TROPONIN T: <0.03 NG/ML
TURBIDITY: CLEAR
TURBIDITY: CLEAR
UNSATURATED IRON BINDING CAPACITY: 199 UG/DL (ref 112–347)
URINE HGB: ABNORMAL
URINE HGB: NEGATIVE
UROBILINOGEN, URINE: NORMAL
UROBILINOGEN, URINE: NORMAL
VITAMIN B-12: 372 PG/ML (ref 232–1245)
VLDLC SERPL CALC-MCNC: ABNORMAL MG/DL (ref 1–30)
WBC # BLD: 10.6 K/UL (ref 3.5–11.3)
WBC # BLD: 7.2 K/UL (ref 3.5–11)
WBC # BLD: 8.8 K/UL (ref 3.5–11)
WBC # BLD: 9.1 K/UL (ref 3.5–11)
WBC # BLD: ABNORMAL 10*3/UL
WBC UA: NORMAL /HPF (ref 0–5)
YEAST: NORMAL

## 2018-01-01 PROCEDURE — 84484 ASSAY OF TROPONIN QUANT: CPT

## 2018-01-01 PROCEDURE — 2700000000 HC OXYGEN THERAPY PER DAY

## 2018-01-01 PROCEDURE — 86334 IMMUNOFIX E-PHORESIS SERUM: CPT

## 2018-01-01 PROCEDURE — 82607 VITAMIN B-12: CPT

## 2018-01-01 PROCEDURE — 96366 THER/PROPH/DIAG IV INF ADDON: CPT

## 2018-01-01 PROCEDURE — 96368 THER/DIAG CONCURRENT INF: CPT

## 2018-01-01 PROCEDURE — 84132 ASSAY OF SERUM POTASSIUM: CPT

## 2018-01-01 PROCEDURE — 96360 HYDRATION IV INFUSION INIT: CPT

## 2018-01-01 PROCEDURE — 80048 BASIC METABOLIC PNL TOTAL CA: CPT

## 2018-01-01 PROCEDURE — 84295 ASSAY OF SERUM SODIUM: CPT

## 2018-01-01 PROCEDURE — 99284 EMERGENCY DEPT VISIT MOD MDM: CPT

## 2018-01-01 PROCEDURE — 83605 ASSAY OF LACTIC ACID: CPT

## 2018-01-01 PROCEDURE — 2500000003 HC RX 250 WO HCPCS: Performed by: INTERNAL MEDICINE

## 2018-01-01 PROCEDURE — 85025 COMPLETE CBC W/AUTO DIFF WBC: CPT

## 2018-01-01 PROCEDURE — 94761 N-INVAS EAR/PLS OXIMETRY MLT: CPT

## 2018-01-01 PROCEDURE — 83880 ASSAY OF NATRIURETIC PEPTIDE: CPT

## 2018-01-01 PROCEDURE — 36415 COLL VENOUS BLD VENIPUNCTURE: CPT

## 2018-01-01 PROCEDURE — 83735 ASSAY OF MAGNESIUM: CPT

## 2018-01-01 PROCEDURE — 83874 ASSAY OF MYOGLOBIN: CPT

## 2018-01-01 PROCEDURE — 6370000000 HC RX 637 (ALT 250 FOR IP): Performed by: INTERNAL MEDICINE

## 2018-01-01 PROCEDURE — 85014 HEMATOCRIT: CPT

## 2018-01-01 PROCEDURE — 94640 AIRWAY INHALATION TREATMENT: CPT

## 2018-01-01 PROCEDURE — 2060000000 HC ICU INTERMEDIATE R&B

## 2018-01-01 PROCEDURE — 82330 ASSAY OF CALCIUM: CPT

## 2018-01-01 PROCEDURE — A9552 F18 FDG: HCPCS | Performed by: INTERNAL MEDICINE

## 2018-01-01 PROCEDURE — 81003 URINALYSIS AUTO W/O SCOPE: CPT

## 2018-01-01 PROCEDURE — 78815 PET IMAGE W/CT SKULL-THIGH: CPT

## 2018-01-01 PROCEDURE — B2111ZZ FLUOROSCOPY OF MULTIPLE CORONARY ARTERIES USING LOW OSMOLAR CONTRAST: ICD-10-PCS | Performed by: INTERNAL MEDICINE

## 2018-01-01 PROCEDURE — 3430000000 HC RX DIAGNOSTIC RADIOPHARMACEUTICAL: Performed by: INTERNAL MEDICINE

## 2018-01-01 PROCEDURE — 80076 HEPATIC FUNCTION PANEL: CPT

## 2018-01-01 PROCEDURE — 82947 ASSAY GLUCOSE BLOOD QUANT: CPT

## 2018-01-01 PROCEDURE — 93458 L HRT ARTERY/VENTRICLE ANGIO: CPT | Performed by: INTERNAL MEDICINE

## 2018-01-01 PROCEDURE — 94002 VENT MGMT INPAT INIT DAY: CPT

## 2018-01-01 PROCEDURE — 83690 ASSAY OF LIPASE: CPT

## 2018-01-01 PROCEDURE — 4A023N7 MEASUREMENT OF CARDIAC SAMPLING AND PRESSURE, LEFT HEART, PERCUTANEOUS APPROACH: ICD-10-PCS | Performed by: INTERNAL MEDICINE

## 2018-01-01 PROCEDURE — 71046 X-RAY EXAM CHEST 2 VIEWS: CPT

## 2018-01-01 PROCEDURE — 51703 INSERT BLADDER CATH COMPLEX: CPT

## 2018-01-01 PROCEDURE — 83550 IRON BINDING TEST: CPT

## 2018-01-01 PROCEDURE — 99285 EMERGENCY DEPT VISIT HI MDM: CPT

## 2018-01-01 PROCEDURE — 80061 LIPID PANEL: CPT

## 2018-01-01 PROCEDURE — 93005 ELECTROCARDIOGRAM TRACING: CPT

## 2018-01-01 PROCEDURE — 71045 X-RAY EXAM CHEST 1 VIEW: CPT

## 2018-01-01 PROCEDURE — 96375 TX/PRO/DX INJ NEW DRUG ADDON: CPT

## 2018-01-01 PROCEDURE — 70450 CT HEAD/BRAIN W/O DYE: CPT

## 2018-01-01 PROCEDURE — 85730 THROMBOPLASTIN TIME PARTIAL: CPT

## 2018-01-01 PROCEDURE — 94770 HC ETCO2 MONITOR DAILY: CPT

## 2018-01-01 PROCEDURE — 82803 BLOOD GASES ANY COMBINATION: CPT

## 2018-01-01 PROCEDURE — 86038 ANTINUCLEAR ANTIBODIES: CPT

## 2018-01-01 PROCEDURE — 2580000003 HC RX 258: Performed by: EMERGENCY MEDICINE

## 2018-01-01 PROCEDURE — 99291 CRITICAL CARE FIRST HOUR: CPT | Performed by: INTERNAL MEDICINE

## 2018-01-01 PROCEDURE — 87086 URINE CULTURE/COLONY COUNT: CPT

## 2018-01-01 PROCEDURE — 6360000002 HC RX W HCPCS

## 2018-01-01 PROCEDURE — 2580000003 HC RX 258: Performed by: INTERNAL MEDICINE

## 2018-01-01 PROCEDURE — 94760 N-INVAS EAR/PLS OXIMETRY 1: CPT

## 2018-01-01 PROCEDURE — 96365 THER/PROPH/DIAG IV INF INIT: CPT

## 2018-01-01 PROCEDURE — 85379 FIBRIN DEGRADATION QUANT: CPT

## 2018-01-01 PROCEDURE — 84155 ASSAY OF PROTEIN SERUM: CPT

## 2018-01-01 PROCEDURE — 82565 ASSAY OF CREATININE: CPT

## 2018-01-01 PROCEDURE — 2500000003 HC RX 250 WO HCPCS

## 2018-01-01 PROCEDURE — 83540 ASSAY OF IRON: CPT

## 2018-01-01 PROCEDURE — 82728 ASSAY OF FERRITIN: CPT

## 2018-01-01 PROCEDURE — 92950 HEART/LUNG RESUSCITATION CPR: CPT

## 2018-01-01 PROCEDURE — 93306 TTE W/DOPPLER COMPLETE: CPT

## 2018-01-01 PROCEDURE — 81001 URINALYSIS AUTO W/SCOPE: CPT

## 2018-01-01 PROCEDURE — 85045 AUTOMATED RETICULOCYTE COUNT: CPT

## 2018-01-01 PROCEDURE — C1760 CLOSURE DEV, VASC: HCPCS

## 2018-01-01 PROCEDURE — 84165 PROTEIN E-PHORESIS SERUM: CPT

## 2018-01-01 PROCEDURE — 82272 OCCULT BLD FECES 1-3 TESTS: CPT

## 2018-01-01 PROCEDURE — 82435 ASSAY OF BLOOD CHLORIDE: CPT

## 2018-01-01 PROCEDURE — 36600 WITHDRAWAL OF ARTERIAL BLOOD: CPT

## 2018-01-01 PROCEDURE — 72125 CT NECK SPINE W/O DYE: CPT

## 2018-01-01 PROCEDURE — B2151ZZ FLUOROSCOPY OF LEFT HEART USING LOW OSMOLAR CONTRAST: ICD-10-PCS | Performed by: INTERNAL MEDICINE

## 2018-01-01 PROCEDURE — 94762 N-INVAS EAR/PLS OXIMTRY CONT: CPT

## 2018-01-01 PROCEDURE — C1894 INTRO/SHEATH, NON-LASER: HCPCS

## 2018-01-01 PROCEDURE — 83010 ASSAY OF HAPTOGLOBIN QUANT: CPT

## 2018-01-01 PROCEDURE — 6360000002 HC RX W HCPCS: Performed by: EMERGENCY MEDICINE

## 2018-01-01 PROCEDURE — 82746 ASSAY OF FOLIC ACID SERUM: CPT

## 2018-01-01 PROCEDURE — C1725 CATH, TRANSLUMIN NON-LASER: HCPCS

## 2018-01-01 PROCEDURE — 96361 HYDRATE IV INFUSION ADD-ON: CPT

## 2018-01-01 RX ORDER — CHLORHEXIDINE GLUCONATE 0.12 MG/ML
15 RINSE ORAL 2 TIMES DAILY
Status: CANCELLED | OUTPATIENT
Start: 2018-01-01

## 2018-01-01 RX ORDER — MORPHINE SULFATE 4 MG/ML
4 INJECTION, SOLUTION INTRAMUSCULAR; INTRAVENOUS
Status: CANCELLED | OUTPATIENT
Start: 2018-01-01

## 2018-01-01 RX ORDER — CARVEDILOL 6.25 MG/1
6.25 TABLET ORAL 2 TIMES DAILY WITH MEALS
Status: DISCONTINUED | OUTPATIENT
Start: 2018-01-01 | End: 2018-01-01

## 2018-01-01 RX ORDER — MAGNESIUM SULFATE 1 G/100ML
1 INJECTION INTRAVENOUS
Status: COMPLETED | OUTPATIENT
Start: 2018-01-01 | End: 2018-01-01

## 2018-01-01 RX ORDER — POTASSIUM ACETATE 3.93 G/20ML
INJECTION, SOLUTION, CONCENTRATE INTRAVENOUS
Status: ON HOLD | COMMUNITY
End: 2018-01-01

## 2018-01-01 RX ORDER — FUROSEMIDE 10 MG/ML
20 INJECTION INTRAMUSCULAR; INTRAVENOUS ONCE
Status: COMPLETED | OUTPATIENT
Start: 2018-01-01 | End: 2018-01-01

## 2018-01-01 RX ORDER — ACETAMINOPHEN 325 MG/1
650 TABLET ORAL EVERY 4 HOURS PRN
Status: CANCELLED | OUTPATIENT
Start: 2018-01-01

## 2018-01-01 RX ORDER — HEPARIN SODIUM 1000 [USP'U]/ML
2000 INJECTION, SOLUTION INTRAVENOUS; SUBCUTANEOUS PRN
Status: DISCONTINUED | OUTPATIENT
Start: 2018-01-01 | End: 2018-01-01 | Stop reason: HOSPADM

## 2018-01-01 RX ORDER — TAMSULOSIN HYDROCHLORIDE 0.4 MG/1
0.4 CAPSULE ORAL DAILY
Status: DISCONTINUED | OUTPATIENT
Start: 2018-01-01 | End: 2018-01-01 | Stop reason: HOSPADM

## 2018-01-01 RX ORDER — BUMETANIDE 0.25 MG/ML
1 INJECTION, SOLUTION INTRAMUSCULAR; INTRAVENOUS 2 TIMES DAILY
Status: DISCONTINUED | OUTPATIENT
Start: 2018-01-01 | End: 2018-01-01

## 2018-01-01 RX ORDER — VALSARTAN 40 MG/1
40 TABLET ORAL DAILY
Qty: 30 TABLET | Refills: 3 | Status: SHIPPED | OUTPATIENT
Start: 2018-01-01

## 2018-01-01 RX ORDER — ONDANSETRON 2 MG/ML
4 INJECTION INTRAMUSCULAR; INTRAVENOUS EVERY 6 HOURS PRN
Status: CANCELLED | OUTPATIENT
Start: 2018-01-01

## 2018-01-01 RX ORDER — POTASSIUM CHLORIDE 7.45 MG/ML
10 INJECTION INTRAVENOUS
Status: DISCONTINUED | OUTPATIENT
Start: 2018-01-01 | End: 2018-01-01 | Stop reason: HOSPADM

## 2018-01-01 RX ORDER — POTASSIUM CHLORIDE 20 MEQ/1
20 TABLET, EXTENDED RELEASE ORAL 2 TIMES DAILY WITH MEALS
Status: DISCONTINUED | OUTPATIENT
Start: 2018-01-01 | End: 2018-01-01 | Stop reason: HOSPADM

## 2018-01-01 RX ORDER — SODIUM CHLORIDE 9 MG/ML
INJECTION, SOLUTION INTRAVENOUS CONTINUOUS
Status: DISCONTINUED | OUTPATIENT
Start: 2018-01-01 | End: 2018-01-01 | Stop reason: HOSPADM

## 2018-01-01 RX ORDER — ATORVASTATIN CALCIUM 40 MG/1
40 TABLET, FILM COATED ORAL NIGHTLY
Qty: 30 TABLET | Refills: 3 | Status: SHIPPED | OUTPATIENT
Start: 2018-01-01

## 2018-01-01 RX ORDER — SODIUM CHLORIDE 9 MG/ML
INJECTION, SOLUTION INTRAVENOUS CONTINUOUS
Status: DISCONTINUED | OUTPATIENT
Start: 2018-01-01 | End: 2018-01-01

## 2018-01-01 RX ORDER — HEPARIN SODIUM 10000 [USP'U]/100ML
12 INJECTION, SOLUTION INTRAVENOUS CONTINUOUS
Status: DISCONTINUED | OUTPATIENT
Start: 2018-01-01 | End: 2018-01-01 | Stop reason: HOSPADM

## 2018-01-01 RX ORDER — FLUDEOXYGLUCOSE F 18 200 MCI/ML
14.77 INJECTION, SOLUTION INTRAVENOUS
Status: COMPLETED | OUTPATIENT
Start: 2018-01-01 | End: 2018-01-01

## 2018-01-01 RX ORDER — SODIUM CHLORIDE 0.9 % (FLUSH) 0.9 %
10 SYRINGE (ML) INJECTION PRN
Status: CANCELLED | OUTPATIENT
Start: 2018-01-01

## 2018-01-01 RX ORDER — CARVEDILOL 3.12 MG/1
3.12 TABLET ORAL 2 TIMES DAILY WITH MEALS
Status: DISCONTINUED | OUTPATIENT
Start: 2018-01-01 | End: 2018-01-01 | Stop reason: HOSPADM

## 2018-01-01 RX ORDER — VALSARTAN 40 MG/1
40 TABLET ORAL DAILY
Status: DISCONTINUED | OUTPATIENT
Start: 2018-01-01 | End: 2018-01-01 | Stop reason: HOSPADM

## 2018-01-01 RX ORDER — SODIUM CHLORIDE 9 MG/ML
INJECTION, SOLUTION INTRAVENOUS CONTINUOUS
Status: CANCELLED | OUTPATIENT
Start: 2018-01-01

## 2018-01-01 RX ORDER — POTASSIUM CHLORIDE 20 MEQ/1
20 TABLET, EXTENDED RELEASE ORAL DAILY
Qty: 30 TABLET | Refills: 3 | Status: SHIPPED | OUTPATIENT
Start: 2018-01-01

## 2018-01-01 RX ORDER — GABAPENTIN 100 MG/1
100 CAPSULE ORAL 3 TIMES DAILY
Status: DISCONTINUED | OUTPATIENT
Start: 2018-01-01 | End: 2018-01-01 | Stop reason: HOSPADM

## 2018-01-01 RX ORDER — VALSARTAN 80 MG/1
80 TABLET ORAL DAILY
Status: DISCONTINUED | OUTPATIENT
Start: 2018-01-01 | End: 2018-01-01

## 2018-01-01 RX ORDER — ASPIRIN 81 MG/1
81 TABLET ORAL DAILY
Status: DISCONTINUED | OUTPATIENT
Start: 2018-01-01 | End: 2018-01-01 | Stop reason: HOSPADM

## 2018-01-01 RX ORDER — HEPARIN SODIUM 1000 [USP'U]/ML
4000 INJECTION, SOLUTION INTRAVENOUS; SUBCUTANEOUS PRN
Status: DISCONTINUED | OUTPATIENT
Start: 2018-01-01 | End: 2018-01-01 | Stop reason: HOSPADM

## 2018-01-01 RX ORDER — SODIUM CHLORIDE 0.9 % (FLUSH) 0.9 %
10 SYRINGE (ML) INJECTION EVERY 12 HOURS SCHEDULED
Status: CANCELLED | OUTPATIENT
Start: 2018-01-01

## 2018-01-01 RX ORDER — HEPARIN SODIUM 1000 [USP'U]/ML
4000 INJECTION, SOLUTION INTRAVENOUS; SUBCUTANEOUS ONCE
Status: COMPLETED | OUTPATIENT
Start: 2018-01-01 | End: 2018-01-01

## 2018-01-01 RX ORDER — BUMETANIDE 1 MG/1
1 TABLET ORAL DAILY
Qty: 30 TABLET | Refills: 3 | Status: SHIPPED | OUTPATIENT
Start: 2018-01-01

## 2018-01-01 RX ORDER — BUMETANIDE 1 MG/1
1 TABLET ORAL DAILY
Status: DISCONTINUED | OUTPATIENT
Start: 2018-01-01 | End: 2018-01-01 | Stop reason: HOSPADM

## 2018-01-01 RX ORDER — ATORVASTATIN CALCIUM 40 MG/1
40 TABLET, FILM COATED ORAL NIGHTLY
Status: DISCONTINUED | OUTPATIENT
Start: 2018-01-01 | End: 2018-01-01 | Stop reason: HOSPADM

## 2018-01-01 RX ORDER — MORPHINE SULFATE 4 MG/ML
2 INJECTION, SOLUTION INTRAMUSCULAR; INTRAVENOUS
Status: CANCELLED | OUTPATIENT
Start: 2018-01-01

## 2018-01-01 RX ORDER — CARVEDILOL 3.12 MG/1
3.12 TABLET ORAL 2 TIMES DAILY WITH MEALS
Qty: 60 TABLET | Refills: 3 | Status: SHIPPED | OUTPATIENT
Start: 2018-01-01

## 2018-01-01 RX ADMIN — VALSARTAN 40 MG: 80 TABLET ORAL at 09:07

## 2018-01-01 RX ADMIN — POTASSIUM CHLORIDE 20 MEQ: 20 TABLET, EXTENDED RELEASE ORAL at 08:27

## 2018-01-01 RX ADMIN — ASPIRIN 81 MG: 81 TABLET, COATED ORAL at 09:39

## 2018-01-01 RX ADMIN — ASPIRIN 81 MG: 81 TABLET, COATED ORAL at 10:09

## 2018-01-01 RX ADMIN — GABAPENTIN 100 MG: 100 CAPSULE ORAL at 14:12

## 2018-01-01 RX ADMIN — Medication 2 PUFF: at 20:09

## 2018-01-01 RX ADMIN — POTASSIUM CHLORIDE 20 MEQ: 20 TABLET, EXTENDED RELEASE ORAL at 09:39

## 2018-01-01 RX ADMIN — POTASSIUM CHLORIDE 20 MEQ: 20 TABLET, EXTENDED RELEASE ORAL at 18:33

## 2018-01-01 RX ADMIN — SODIUM CHLORIDE: 9 INJECTION, SOLUTION INTRAVENOUS at 16:23

## 2018-01-01 RX ADMIN — ASPIRIN 81 MG: 81 TABLET, COATED ORAL at 09:51

## 2018-01-01 RX ADMIN — VALSARTAN 40 MG: 80 TABLET ORAL at 09:39

## 2018-01-01 RX ADMIN — BUMETANIDE 1 MG: 1 TABLET ORAL at 10:07

## 2018-01-01 RX ADMIN — SODIUM CHLORIDE: 9 INJECTION, SOLUTION INTRAVENOUS at 18:31

## 2018-01-01 RX ADMIN — TAMSULOSIN HYDROCHLORIDE 0.4 MG: 0.4 CAPSULE ORAL at 09:22

## 2018-01-01 RX ADMIN — CARVEDILOL 3.12 MG: 6.25 TABLET, FILM COATED ORAL at 08:27

## 2018-01-01 RX ADMIN — Medication 2 PUFF: at 07:55

## 2018-01-01 RX ADMIN — POTASSIUM CHLORIDE 20 MEQ: 20 TABLET, EXTENDED RELEASE ORAL at 09:22

## 2018-01-01 RX ADMIN — BUMETANIDE 1 MG: 1 TABLET ORAL at 19:38

## 2018-01-01 RX ADMIN — CARVEDILOL 3.12 MG: 6.25 TABLET, FILM COATED ORAL at 17:38

## 2018-01-01 RX ADMIN — GABAPENTIN 100 MG: 100 CAPSULE ORAL at 19:20

## 2018-01-01 RX ADMIN — GABAPENTIN 100 MG: 100 CAPSULE ORAL at 20:21

## 2018-01-01 RX ADMIN — GABAPENTIN 100 MG: 100 CAPSULE ORAL at 19:43

## 2018-01-01 RX ADMIN — Medication 2 PUFF: at 21:01

## 2018-01-01 RX ADMIN — POTASSIUM CHLORIDE 20 MEQ: 20 TABLET, EXTENDED RELEASE ORAL at 10:07

## 2018-01-01 RX ADMIN — GABAPENTIN 100 MG: 100 CAPSULE ORAL at 13:32

## 2018-01-01 RX ADMIN — CARVEDILOL 6.25 MG: 6.25 TABLET, FILM COATED ORAL at 21:09

## 2018-01-01 RX ADMIN — BUMETANIDE 1 MG: 1 TABLET ORAL at 09:07

## 2018-01-01 RX ADMIN — POTASSIUM CHLORIDE 10 MEQ: 7.46 INJECTION, SOLUTION INTRAVENOUS at 16:02

## 2018-01-01 RX ADMIN — Medication 2 PUFF: at 09:00

## 2018-01-01 RX ADMIN — CARVEDILOL 3.12 MG: 6.25 TABLET, FILM COATED ORAL at 09:39

## 2018-01-01 RX ADMIN — Medication 2 PUFF: at 09:32

## 2018-01-01 RX ADMIN — POTASSIUM CHLORIDE 20 MEQ: 20 TABLET, EXTENDED RELEASE ORAL at 09:51

## 2018-01-01 RX ADMIN — FLUDEOXYGLUCOSE F 18 14.77 MILLICURIE: 200 INJECTION, SOLUTION INTRAVENOUS at 08:10

## 2018-01-01 RX ADMIN — VALSARTAN 80 MG: 80 TABLET ORAL at 09:50

## 2018-01-01 RX ADMIN — CARVEDILOL 3.12 MG: 6.25 TABLET, FILM COATED ORAL at 17:01

## 2018-01-01 RX ADMIN — TAMSULOSIN HYDROCHLORIDE 0.4 MG: 0.4 CAPSULE ORAL at 09:07

## 2018-01-01 RX ADMIN — BUMETANIDE 1 MG: 1 TABLET ORAL at 08:28

## 2018-01-01 RX ADMIN — CARVEDILOL 3.12 MG: 6.25 TABLET, FILM COATED ORAL at 10:09

## 2018-01-01 RX ADMIN — ASPIRIN 81 MG: 81 TABLET, COATED ORAL at 09:23

## 2018-01-01 RX ADMIN — ASPIRIN 81 MG: 81 TABLET, COATED ORAL at 09:07

## 2018-01-01 RX ADMIN — BUMETANIDE 1 MG: 0.25 INJECTION INTRAMUSCULAR; INTRAVENOUS at 09:53

## 2018-01-01 RX ADMIN — TAMSULOSIN HYDROCHLORIDE 0.4 MG: 0.4 CAPSULE ORAL at 09:39

## 2018-01-01 RX ADMIN — POTASSIUM CHLORIDE 20 MEQ: 20 TABLET, EXTENDED RELEASE ORAL at 21:09

## 2018-01-01 RX ADMIN — POTASSIUM CHLORIDE 20 MEQ: 20 TABLET, EXTENDED RELEASE ORAL at 16:56

## 2018-01-01 RX ADMIN — GABAPENTIN 100 MG: 100 CAPSULE ORAL at 13:52

## 2018-01-01 RX ADMIN — POTASSIUM CHLORIDE 20 MEQ: 20 TABLET, EXTENDED RELEASE ORAL at 17:01

## 2018-01-01 RX ADMIN — POTASSIUM CHLORIDE 20 MEQ: 20 TABLET, EXTENDED RELEASE ORAL at 17:38

## 2018-01-01 RX ADMIN — BUMETANIDE 1 MG: 0.25 INJECTION INTRAMUSCULAR; INTRAVENOUS at 19:18

## 2018-01-01 RX ADMIN — CARVEDILOL 3.12 MG: 6.25 TABLET, FILM COATED ORAL at 09:07

## 2018-01-01 RX ADMIN — GABAPENTIN 100 MG: 100 CAPSULE ORAL at 21:09

## 2018-01-01 RX ADMIN — GABAPENTIN 100 MG: 100 CAPSULE ORAL at 20:34

## 2018-01-01 RX ADMIN — CARVEDILOL 3.12 MG: 6.25 TABLET, FILM COATED ORAL at 16:56

## 2018-01-01 RX ADMIN — Medication 2 PUFF: at 09:46

## 2018-01-01 RX ADMIN — GABAPENTIN 100 MG: 100 CAPSULE ORAL at 09:51

## 2018-01-01 RX ADMIN — CARVEDILOL 6.25 MG: 6.25 TABLET, FILM COATED ORAL at 09:22

## 2018-01-01 RX ADMIN — MAGNESIUM SULFATE HEPTAHYDRATE 1 G: 1 INJECTION, SOLUTION INTRAVENOUS at 16:02

## 2018-01-01 RX ADMIN — CARVEDILOL 3.12 MG: 6.25 TABLET, FILM COATED ORAL at 18:32

## 2018-01-01 RX ADMIN — Medication 2 PUFF: at 20:24

## 2018-01-01 RX ADMIN — CARVEDILOL 6.25 MG: 6.25 TABLET, FILM COATED ORAL at 09:52

## 2018-01-01 RX ADMIN — FUROSEMIDE 20 MG: 10 INJECTION, SOLUTION INTRAVENOUS at 15:55

## 2018-01-01 RX ADMIN — Medication 5 MG/HR: at 13:46

## 2018-01-01 RX ADMIN — TAMSULOSIN HYDROCHLORIDE 0.4 MG: 0.4 CAPSULE ORAL at 08:26

## 2018-01-01 RX ADMIN — BUMETANIDE 1 MG: 1 TABLET ORAL at 09:39

## 2018-01-01 RX ADMIN — GABAPENTIN 100 MG: 100 CAPSULE ORAL at 08:27

## 2018-01-01 RX ADMIN — Medication 2 PUFF: at 09:52

## 2018-01-01 RX ADMIN — ASPIRIN 81 MG: 81 TABLET, COATED ORAL at 08:27

## 2018-01-01 RX ADMIN — SODIUM CHLORIDE: 9 INJECTION, SOLUTION INTRAVENOUS at 00:20

## 2018-01-01 RX ADMIN — GABAPENTIN 100 MG: 100 CAPSULE ORAL at 14:29

## 2018-01-01 RX ADMIN — GABAPENTIN 100 MG: 100 CAPSULE ORAL at 09:07

## 2018-01-01 RX ADMIN — Medication 2 PUFF: at 21:09

## 2018-01-01 RX ADMIN — HEPARIN SODIUM 4000 UNITS: 1000 INJECTION, SOLUTION INTRAVENOUS; SUBCUTANEOUS at 15:33

## 2018-01-01 RX ADMIN — TAMSULOSIN HYDROCHLORIDE 0.4 MG: 0.4 CAPSULE ORAL at 10:07

## 2018-01-01 RX ADMIN — GABAPENTIN 100 MG: 100 CAPSULE ORAL at 14:47

## 2018-01-01 RX ADMIN — MAGNESIUM SULFATE HEPTAHYDRATE 1 G: 1 INJECTION, SOLUTION INTRAVENOUS at 14:40

## 2018-01-01 RX ADMIN — HEPARIN SODIUM AND DEXTROSE 12 UNITS/KG/HR: 10000; 5 INJECTION INTRAVENOUS at 15:33

## 2018-01-01 RX ADMIN — GABAPENTIN 100 MG: 100 CAPSULE ORAL at 09:39

## 2018-01-01 RX ADMIN — EPINEPHRINE 1 MCG/MIN: 1 INJECTION PARENTERAL at 16:34

## 2018-01-01 RX ADMIN — POTASSIUM CHLORIDE 10 MEQ: 7.46 INJECTION, SOLUTION INTRAVENOUS at 14:39

## 2018-01-01 RX ADMIN — POTASSIUM CHLORIDE 20 MEQ: 20 TABLET, EXTENDED RELEASE ORAL at 17:28

## 2018-01-01 RX ADMIN — TAMSULOSIN HYDROCHLORIDE 0.4 MG: 0.4 CAPSULE ORAL at 09:52

## 2018-01-01 RX ADMIN — GABAPENTIN 100 MG: 100 CAPSULE ORAL at 20:12

## 2018-01-01 RX ADMIN — BUMETANIDE 1 MG: 0.25 INJECTION INTRAMUSCULAR; INTRAVENOUS at 09:23

## 2018-01-01 RX ADMIN — BUMETANIDE 1 MG: 0.25 INJECTION INTRAMUSCULAR; INTRAVENOUS at 21:08

## 2018-01-01 RX ADMIN — POTASSIUM CHLORIDE 20 MEQ: 20 TABLET, EXTENDED RELEASE ORAL at 09:07

## 2018-01-01 RX ADMIN — GABAPENTIN 100 MG: 100 CAPSULE ORAL at 09:22

## 2018-01-01 RX ADMIN — GABAPENTIN 100 MG: 100 CAPSULE ORAL at 10:08

## 2018-01-01 RX ADMIN — VALSARTAN 40 MG: 80 TABLET ORAL at 08:26

## 2018-01-01 ASSESSMENT — ENCOUNTER SYMPTOMS
GASTROINTESTINAL NEGATIVE: 1
ALLERGIC/IMMUNOLOGIC NEGATIVE: 1
SHORTNESS OF BREATH: 1
EYES NEGATIVE: 1

## 2018-01-01 ASSESSMENT — PAIN SCALES - GENERAL
PAINLEVEL_OUTOF10: 0

## 2018-01-01 ASSESSMENT — PULMONARY FUNCTION TESTS: PIF_VALUE: 22

## 2018-02-01 PROBLEM — I50.31 ACUTE DIASTOLIC HEART FAILURE (HCC): Status: ACTIVE | Noted: 2018-01-01

## 2018-02-01 PROBLEM — I27.20 PULMONARY HYPERTENSION (HCC): Status: ACTIVE | Noted: 2018-01-01

## 2018-02-01 PROBLEM — I50.30 DIASTOLIC HEART FAILURE (HCC): Status: ACTIVE | Noted: 2018-01-01

## 2018-02-01 PROBLEM — J84.10 PULMONARY FIBROSIS (HCC): Status: ACTIVE | Noted: 2018-01-01

## 2018-02-01 NOTE — H&P
Department of Internal Medicine  Attending History and Physical        CHIEF COMPLAINT:  Shortness of breath, leg swelling    History Obtained From:  Patient/Records    HISTORY OF PRESENT ILLNESS:    Seen in the office today for progressive exertional dyspnea without precordial chest pains, palpitations, PND or orthopnea. No associated symptoms of light-headedness or syncope. Pt does have problems with dyspnea on exertion, has pulmonary fibrosis and is on home oxygen. Lately, however, he had noted progressive pedal edema and worsening dyspnea. Labs were ordered from the office. BNP was significantly elevated. Pt was sent to the ER and subsequently admitted for further management/evaluaiton. Past Medical/Surgical History:  Past Medical History:   Diagnosis Date    Arthritis of both knees     Atrial fibrillation ([facility] [address]     Cancer [facility])     prostate  - resolved radiation seeds    Chest pain     History of cardiac cath     Hx MRSA infection resolved 12/2017    2 negative nasal screen - 12/2017 (right knee - 2009)    Sweating          Past Surgical History:   Procedure Laterality Date    APPENDECTOMY      JOINT REPLACEMENT Bilateral     knees    TONSILLECTOMY         No current facility-administered medications on file prior to encounter. Current Outpatient Prescriptions on File Prior to Encounter   Medication Sig Dispense Refill    aspirin 81 MG tablet Take 81 mg by mouth daily.  diltiazem (CARDIZEM) 30 MG tablet Take 1 tablet by mouth daily 30 tablet 3    tamsulosin (FLOMAX) 0.4 MG capsule Take 1 capsule by mouth daily 30 capsule 3    mometasone-formoterol (DULERA) 100-5 MCG/ACT inhaler Inhale 2 puffs into the lungs 2 times daily 1 Inhaler 3    gabapentin (NEURONTIN) 100 MG capsule Take 100 mg by mouth 3 times daily          Allergies:  Review of patient's allergies indicates no known allergies.     Social History:   Social History     Social History    Marital status:      Spouse name: N/A    Number of children: N/A    Years of education: N/A     Occupational History    Not on file. Social History Main Topics    Smoking status: Former Smoker    Smokeless tobacco: Never Used      Comment: quit smoking greater than 30 years ago    Alcohol use Yes    Drug use: No    Sexual activity: Not on file     Other Topics Concern    Not on file     Social History Narrative    No narrative on file       Family History:   None on record. REVIEW OF SYSTEMS:  CONSTITUTIONAL:  negative for  fevers and chills  EYES:  negative for  double vision, blurred vision and dry eyes  HEENT:  negative for  tinnitus, ear drainage, nasal congestion and epistaxis  RESPIRATORY:  Pulmonary fibrosis, chronic respiratory failure - on home Oxygen. CARDIOVASCULAR:  See present illness. GASTROINTESTINAL:  negative for nausea, vomiting, change in bowel habits, abdominal pain, jaundice, dysphagia and odynophagia  GENITOURINARY:  negative for dysuria and hematuria  INTEGUMENT/BREAST:  negative for rash, skin lesion(s) and skin color change  HEMATOLOGIC/LYMPHATIC:  negative for easy bruising, bleeding, lymphadenopathy and petechiae  ALLERGIC/IMMUNOLOGIC:  No known allergies.   ENDOCRINE:  negative for heat intolerance, cold intolerance and tremor  MUSCULOSKELETAL:  negative for  arthralgias, pain and joint swelling  NEUROLOGICAL:  negative for dizziness, seizures, memory problems, syncope and near syncope  BEHAVIOR/PSYCH:  negative for depressed mood    PHYSICAL EXAM:  Vitals:  /75   Pulse 87   Temp 97.6 °F (36.4 °C) (Oral)   Resp 18   Ht 5' 10\" (1.778 m)   Wt 181 lb (82.1 kg)   SpO2 96%   BMI 25.97 kg/m²     General Appearance: alert and oriented to person, place and time and in no acute distress  Skin: warm and dry, no rash or erythema and no suspicious lesions noted  Head: normocephalic and atraumatic  Eyes: pupils equal, round, and reactive to light, extraocular eye movements intact, conjunctivae normal and sclera anicteric  ENT: oropharynx clear and moist with normal mucous membranes  Neck: neck supple and non tender without mass, no thyromegaly, no thyroid nodules and no cervical adenopathy   Pulmonary/Chest: no chest wall tenderness. Diminished breath sounds both lung fields. No crepitant rales, no rubs. Cardiovascular: normal rate, regular rhythm, normal S1 and S2, no murmurs, no gallops, intact distal pulses and no carotid bruits  Abdomen: soft, non-tender, non-distended, bowel sounds normal, no organomegaly and no abdominal bruits  Genitourinary: deferred. Extremities: no cyanosis, no clubbing and Ananya's sign negative bilaterally. Gr 3 bilateral pitting pedal edema. Musculoskeletal: no swollen joints, no joint deformity and no tender joints  Neurologic: reflexes normal and symmetric, no cranial nerve deficit and speech normal      DATA:  BNP 8,151  Chest X-ray: Cardiomegaly. Pulmonary fibrosis (note: out-patient PET scan - no malignancy)  EKG: ST&T wave abnormalities - consider inferolateral ischemia.  Prolonged QT interval.  Troponin: 0.03  BUN 25  Creatinine 1.20      ASSESSMENT AND PLAN:      Principal Problem:    Acute diastolic heart failure (HCC)  Active Problems:    Pulmonary hypertension    Pulmonary fibrosis (HCC)    Diastolic heart failure ([facility] [address]        Electronically signed by Mart Hernandez MD on 2/1/2018 at 6:40 PM

## 2018-02-01 NOTE — ED PROVIDER NOTES
[address] [place] [address] ED  eMERGENCY dEPARTMENT eNCOUnter      Pt Name: Virginia Licona  MRN: [id number]  [place] 1940  Date of evaluation: 2/1/2018  Provider: Lamar Barr MD    CHIEF COMPLAINT       Chief Complaint   Patient presents with    Abnormal Lab         HISTORY OF PRESENT ILLNESS  (Location/Symptom, Timing/Onset, Context/Setting, Quality, Duration, Modifying Factors, Severity.)   Virginia Licona is a 76 y.o. male who presents to the emergency department   The complaint of shortness of breath swelling of the extremity, seen by PCP office BNP was elevated   Adv to come to er      Nursing Notes were reviewed. PAST MEDICAL HISTORY     Past Medical History:   Diagnosis Date    Arthritis of both knees     Atrial fibrillation ([facility] [address]     Cancer [facility])     prostate  - resolved radiation seeds    Chest pain     History of cardiac cath     Hx MRSA infection resolved 12/2017    2 negative nasal screen - 12/2017 (right knee - 2009)    Sweating          SURGICAL HISTORY       Past Surgical History:   Procedure Laterality Date    APPENDECTOMY      JOINT REPLACEMENT Bilateral     knees    TONSILLECTOMY           CURRENT MEDICATIONS       Previous Medications    ASPIRIN 81 MG TABLET    Take 81 mg by mouth daily. BUDESONIDE-FORMOTEROL FUMARATE (SYMBICORT IN)    Inhale into the lungs    DILTIAZEM (CARDIZEM) 30 MG TABLET    Take 1 tablet by mouth daily    GABAPENTIN (NEURONTIN) 100 MG CAPSULE    Take 100 mg by mouth 3 times daily    MOMETASONE-FORMOTEROL (DULERA) 100-5 MCG/ACT INHALER    Inhale 2 puffs into the lungs 2 times daily    POTASSIUM ACETATE 2 MEQ/ML INJECTION    Take by mouth    TAMSULOSIN (FLOMAX) 0.4 MG CAPSULE    Take 1 capsule by mouth daily       ALLERGIES     Review of patient's allergies indicates no known allergies.     FAMILY HISTORY       Family History   Problem Relation Age of Onset    Stroke Mother     Cancer Father           SOCIAL HISTORY       Social History     Social History    signs or Co-signs this chart in the absence of a cardiologist.    Nonspecific ST-T wave changes    RADIOLOGY:   Non-plain film images such as CT, Ultrasound and MRI are read by the radiologist. Ascencion Valdes radiographic images are visualized and preliminarily interpreted by the emergency     Xr Chest Standard (2 Vw)    Result Date: 2/1/2018  EXAMINATION: TWO VIEWS OF THE CHEST 2/1/2018 4:14 pm COMPARISON: December 23, 2017 HISTORY: Ordering Physician Provided Reason for Exam: sob Relevant Medical/Surgical History: sob x 6 weeks FINDINGS: Stable cardiomegaly. Calcification of the aortic knob. Persistent extensive interstitial/fibrotic changes along the bilateral lower lobe. No sizable effusion, acute consolidation or discernible pneumothorax. Stable mild chronic/degenerative change of the thoracic spine. Stable appearing chest with persistent findings suggesting chronic fibrotic lung disease. ED BEDSIDE ULTRASOUND:   Performed by ED Physician - none    LABS:  Labs Reviewed   BASIC METABOLIC PANEL - Abnormal; Notable for the following:        Result Value    BUN 25 (*)     Bun/Cre Ratio 21 (*)     GFR Non- 58 (*)     All other components within normal limits   BRAIN NATRIURETIC PEPTIDE - Abnormal; Notable for the following:     Pro-BNP 8,151 (*)     All other components within normal limits   CBC WITH AUTO DIFFERENTIAL - Abnormal; Notable for the following:     RBC 3.39 (*)     Hemoglobin 10.1 (*)     Hematocrit 30.9 (*)     RDW 15.4 (*)     Monocytes 8 (*)     All other components within normal limits   TROPONIN - Abnormal; Notable for the following:     Troponin T 0.03 (*)     All other components within normal limits   URINALYSIS   D-DIMER, QUANTITATIVE       All other labs were within normal range or not returned as of this dictation.     EMERGENCY DEPARTMENT COURSE and DIFFERENTIAL DIAGNOSIS/MDM:   Vitals:    Vitals:    02/01/18 1556   BP: 114/71   Pulse: 94   Resp: 18   Temp: 97.6 °F

## 2018-02-02 PROBLEM — R77.8 ELEVATED TROPONIN I LEVEL: Status: ACTIVE | Noted: 2018-01-01

## 2018-02-02 PROBLEM — I25.9 MYOCARDIAL ISCHEMIA: Status: ACTIVE | Noted: 2018-01-01

## 2018-02-02 NOTE — PLAN OF CARE
Problem: Falls - Risk of  Goal: Absence of falls  Outcome: Ongoing  Siderails up x 2  Hourly rounding. Call light in reach. Instructed to call for assist before attempting out of bed. Remains free from falls and accidental injury at this time. Floor free from obstacles, and bed is locked and in lowest position. Adequate lighting provided.   Bed alarm in place     Problem: OXYGENATION/RESPIRATORY FUNCTION  Goal: Patient will maintain patent airway  Outcome: Ongoing    Goal: Patient will achieve/maintain normal respiratory rate/effort  Respiratory rate and effort will be within normal limits for the patient  Outcome: Ongoing      Problem: HEMODYNAMIC STATUS  Goal: Patient has stable vital signs and fluid balance  Outcome: Ongoing      Problem: ACTIVITY INTOLERANCE/IMPAIRED MOBILITY  Goal: Mobility/activity is maintained at optimum level for patient  Outcome: Ongoing

## 2018-02-02 NOTE — FLOWSHEET NOTE
The patient was sleeping in his chair. The writer gave a silent prayer of continued healing and comfort.      02/02/18 1509   Encounter Summary   Services provided to: Patient   Referral/Consult From: Jacquelyn   Continue Visiting (2/2/2018)   Complexity of Encounter Low   Length of Encounter 15 minutes   Routine   Type Initial   Assessment Sleeping

## 2018-02-02 NOTE — CONSULTS
Cardiovascular Consult Note     TODAY'S DATE: 2/2/2018    Patient name: Farida Oliveira   YOB: 1940  Date of admission:  2/1/2018       Patient seen, examined. Previous clinical entries reviewed. All available laboratory, imaging and ancillary data reviewed. Reason for Consult: Elevated cardiac enzymes, heart failure  Referring Physician: Dr. Cleveland White    History of present Illness:     Farida Oliveira is a 76 y.o. male with past medical history significant for paroxysmal atrial fibrillation-currently in sinus rhythm who has been having increasing episodes of dyspnea on minimal exertion. He has also noticed increasing lower extremity edema that is bilateral and worsening in nature over the last few weeks. Routine workup showed him to have evidence of vascular congestion and elevated B natruretic peptide level suggesting heart failure. He has also been having worsening fatigue. He denies any anginal episodes currently. His EKG does have new ST-T wave abnormalities. His troponin level was nonspecifically elevated. He has been started on IV Bumex and is effectively diuresing. His renal function has been stable. Past Medical History:    has a past medical history of Arthritis of both knees; Atrial fibrillation ([address] Cancer ([facility] [address] Chest pain; History of cardiac cath; Hx MRSA infection; and Sweating.     Surgical History:     Past Surgical History:   Procedure Laterality Date    APPENDECTOMY      JOINT REPLACEMENT Bilateral     knees    TONSILLECTOMY         Medications:   Scheduled Meds:   aspirin  81 mg Oral Daily    gabapentin  100 mg Oral TID    mometasone-formoterol  2 puff Inhalation BID    tamsulosin  0.4 mg Oral Daily    bumetanide  1 mg Intravenous BID    potassium chloride  20 mEq Oral BID     valsartan  80 mg Oral Daily    carvedilol  6.25 mg Oral BID      Continuous Infusions:    Outpatient Prescriptions Marked as Taking for the 2/1/18 encounter [facility] Encounter)

## 2018-02-03 NOTE — PLAN OF CARE
Problem: Falls - Risk of  Intervention: Toileting assistance  Pt up with one assist to bathroom-pt in chair using urinal    Goal: Absence of falls  Outcome: Met This Shift  Zero falls this shift    Problem: OXYGENATION/RESPIRATORY FUNCTION  Goal: Patient will maintain patent airway    Intervention: ASSESS BREATH SOUNDS IN ALL LOBES  Good air exchange t/o  Intervention: ASSESS COUGH AND SPUTUM IF PRESENT  npc strong cough  Intervention: MONITOR INTAKE AND OUTPUT  Monitoring I/os while on diuretics

## 2018-02-04 NOTE — PLAN OF CARE
Problem: Falls - Risk of  Goal: Absence of falls  Outcome: Ongoing  Pt fall risk, fall band present, falling star, safety alarm activated and in use as needed. Hourly rounding performed. Pt encouraged to use call light. See Giovanni Gr fall risk assessment. Problem: OXYGENATION/RESPIRATORY FUNCTION  Goal: Patient will maintain patent airway  Outcome: Ongoing  Assess breath sounds every shift and as needed. Assess oxygenation level & respiration rate. Encourage coughing & deep breathing. Encourage use of incentive spirometer. Assess cough & sputum. Administer oxygen as needed. Intervention: POSITION PATIENT FOR MAXIMUM VENTILATORY EFFICIENCY  Assess breath sounds every shift and as needed. Assess oxygenation level & respiration rate. Encourage coughing & deep breathing. Encourage use of incentive spirometer. Assess cough & sputum. Administer oxygen as needed. Goal: Patient will achieve/maintain normal respiratory rate/effort  Respiratory rate and effort will be within normal limits for the patient   Outcome: Ongoing  Assess for adventitious breath sounds. Monitored SaO2 > 90%. Applied 02 per nasal cannula as needed. Elevated HOB to improve breathing as needed. Problem: HEMODYNAMIC STATUS  Goal: Patient has stable vital signs and fluid balance  Outcome: Ongoing  Monitor vital signs at least every shift & as needed. Monitor intake & output at least every shift. Assess rate & rhythm. Monitor pt's weight daily. Assess color & texture of skin. Problem: ACTIVITY INTOLERANCE/IMPAIRED MOBILITY  Goal: Mobility/activity is maintained at optimum level for patient  Outcome: Ongoing  Assessed musculoskeletal functional level. Assessed ROM & ability to care for self.

## 2018-02-05 NOTE — FLOWSHEET NOTE
02/05/18 0115   Vital Signs   Temp 98 °F (36.7 °C)   Temp Source Oral   Pulse 77   Heart Rate Source Monitor   Resp 20   BP (!) 99/39   Oxygen Therapy   SpO2 100 %   O2 Device None (Room air)   Pt had 19 beat run of Vtach. Pt asytmptoic with vitals above. Labs drawn, K+ of 4.6 and Mg of 2.1   [facility] notified.

## 2018-02-06 NOTE — PLAN OF CARE
Problem: Falls - Risk of  Goal: Absence of falls  Outcome: Ongoing  Siderails up x 2  Hourly rounding. Call light in reach. Instructed to call for assist before attempting out of bed. Remains free from falls and accidental injury at this time. Floor free from obstacles, and bed is locked and in lowest position. Adequate lighting provided.       Problem: OXYGENATION/RESPIRATORY FUNCTION  Goal: Patient will maintain patent airway  Outcome: Ongoing    Goal: Patient will achieve/maintain normal respiratory rate/effort  Respiratory rate and effort will be within normal limits for the patient   Outcome: Ongoing      Problem: HEMODYNAMIC STATUS  Goal: Patient has stable vital signs and fluid balance  Outcome: Ongoing      Problem: ACTIVITY INTOLERANCE/IMPAIRED MOBILITY  Goal: Mobility/activity is maintained at optimum level for patient  Outcome: Ongoing

## 2018-02-06 NOTE — PLAN OF CARE
Problem: Falls - Risk of  Goal: Absence of falls  Outcome: Ongoing  Continue fall precautions including arm band identification, falling star placed outside of the room and alarm at night and when unattended. Use of ambulatory aids when indicated and frequent visual checks. Monitored orientation status. Call light in reach at all times. Bed remains in low locked position. All clutter removed from room All personal belongings in reach. Instructed to call with any needs for assistance. Fall precautions remain in place. Problem: OXYGENATION/RESPIRATORY FUNCTION  Goal: Patient will maintain patent airway  Outcome: Ongoing  Cardiac assessment performed and charted. Assessed cardiac regularity. Monitored for apical/radial deficit. Assessed for any c/o chest pain and or pressure. I&O monitored. Respiratory assessment performed and charted. Lungs assessed. Monitored respiratory rate, rhythm and pattern. O2 available for PRN use. Instructed to report any s/sx of SOB. Assessed for edema. Problem: HEMODYNAMIC STATUS  Goal: Patient has stable vital signs and fluid balance  Outcome: Ongoing  Cardiac assessment performed and charted. VS monitored . Assessed for an apical/radial deficit. Instructed to call with any c/o dizziness and or lightheadedness.

## 2018-02-07 PROBLEM — I21.4 NON-ST ELEVATION (NSTEMI) MYOCARDIAL INFARCTION (HCC): Status: ACTIVE | Noted: 2018-01-01

## 2018-02-07 PROBLEM — I50.33 ACUTE ON CHRONIC DIASTOLIC HEART FAILURE (HCC): Status: ACTIVE | Noted: 2018-01-01

## 2018-02-07 NOTE — PROGRESS NOTES
Admitted from ER  to room 1022. Pt alert and oriented. Pt oriented to call light system and agreeable to call for assistance. Family at bed side. Admission documentation completed  Flu shot received October 2017. Pneumonia shot received within last 5 years   Pt is former smoker.  Quit over 30 years ago
Cardiovascular progress Note          Patient name: Shanique Suggs    YOB: 1940  Date of admission:  2/1/2018       Patient seen, examined. Previous clinical entries reviewed. All available laboratory, imaging and ancillary data reviewed. Subjective:      He had cardiac cath yesterday. Tolerated procedure well. He denies chest pain, palpitations, shortness of breath, edema, orthopnea or PND. Systems review:  Constitutional: No fever/chills. HENT: No headache, neck pain or neck stiffness. No sore throat or dysphagia. Gastrointestinal: No abdominal pain, nausea or vomiting. Cardiac: As Above  Respiratory: As above  Neurologic: No new focal weakness or numbness  Psychiatric: Normal mood and mentation       Examination:   Vitals: /71   Pulse 82   Temp 97.6 °F (36.4 °C) (Oral)   Resp 16   Ht 5' 10\" (1.778 m)   Wt 181 lb 4.8 oz (82.2 kg)   SpO2 100%   BMI 26.01 kg/m²     Intake/Output Summary (Last 24 hours) at 02/07/18 1304  Last data filed at 02/07/18 0917   Gross per 24 hour   Intake                0 ml   Output             1000 ml   Net            -1000 ml       General appearance: Comfortable in no apparent distress. HEENT: No pallor. No icterus  Neck: Supple. Lungs:Generally decreased breath sounds  Heart: S1,S2  Abdomen: Soft  Extremities: No peripheral edema  Skin: No obvious rashes. Musculoskeletal: No obvious deformities. Neurologic: No focal deficits. Labs/ Ancillary data:     CBC:   No results for input(s): WBC, HGB, PLT in the last 72 hours. BMP:    Recent Labs      02/05/18   0128  02/07/18   1043   NA  136  139   K  4.6  4.4   CL  96*  101   CO2  25  24   BUN  21  17   CREATININE  0.98  0.96   GLUCOSE  93  106*     Hepatic: No results for input(s): AST, ALT, ALB, BILITOT, ALKPHOS in the last 72 hours. Troponin:   No results for input(s): TROPONINT in the last 72 hours.     Medications:   Scheduled Meds:   carvedilol  3.125 mg Oral BID WC    valsartan  40
Discharge instructions given to patient, all questions answered. Patient states understanding. Patient waiting for daughter to transport him home.
No cp or dyspnea  Borderline hypotensive  Fluid status is improving  Renal function is stable with diuretics    Cardiac cath with Dr Ponce Greene in 1-2 days
Progress Note    PCP: Raymond He MD  2/7/2018  11:43 AM    Admitting Diagnosis:  Acute diastolic heart failure ([facility]    Problem List:  Active Hospital Problems    Diagnosis Date Noted    Elevated troponin I level [R74.8] 02/02/2018    Non-ST elevation (NSTEMI) myocardial infarction ([facility] [I21.4] 02/02/2018    Acute diastolic heart failure ([facility] [I50.31] 02/01/2018    Pulmonary hypertension [I27.20] 02/01/2018    Pulmonary fibrosis ([facility] [J84.10] 02/01/2018    Acute on chronic diastolic heart failure ([facility] [I50.33] 02/01/2018       Chief Complaint: see above. Subjective: no new complaints. Breathing improved. Overall stable. Allergies:  No Known Allergies    Current Medications:    0.9 % sodium chloride infusion Continuous   carvedilol (COREG) tablet 3.125 mg BID WC   valsartan (DIOVAN) tablet 40 mg Daily   bumetanide (BUMEX) tablet 1 mg Daily   aspirin EC tablet 81 mg Daily   gabapentin (NEURONTIN) capsule 100 mg TID   mometasone-formoterol (DULERA) 100-5 MCG/ACT inhaler 2 puff BID   tamsulosin (FLOMAX) capsule 0.4 mg Daily   potassium chloride (KLOR-CON M) extended release tablet 20 mEq BID WC       Physical Examination:  /71   Pulse 82   Temp 97.6 °F (36.4 °C) (Oral)   Resp 16   Ht 5' 10\" (1.778 m)   Wt 181 lb 4.8 oz (82.2 kg)   SpO2 100%   BMI 26.01 kg/m²   General appearance - in no distress. Mental status - alert, oriented to person, place, and time. Eyes - pupils equal and reactive, extraocular eye movements intact. Nose - normal and patent, no erythema, discharge. Mouth - mucous membranes moist, pharynx normal without lesions. Neck - supple, no significant adenopathy. No neck vein distention. Trachea midline. Chest - clear to auscultation, no wheezes, rales or rhonchi, symmetric air entry. Heart - normal rate, regular rhythm, normal S1, S2, no murmurs, rubs, clicks or gallops. Abdomen - soft, nontender, nondistended, no masses or organomegaly.   Neurological - alert,
Progress Note    Seen/Examined. Current labs reviewed. Dyspnea on exertion better compared to baseline. Alert, in no distress. No neck vein distention. Lungs clear. Rhythm regular, no gallops, murmurs, rubs. Abdomen benign. Very minimal edema, distal pulses intact. Negative Ananya's. For cardiac cath this afternoon. Await results/proceed accordingly. Discussed with patient.
Progress Note    Seen/Examined. States breathing better compared to baseline. BP still in the low side but patient is asymptomatic. Continuing current regimen. Dr. Lien Telles note appreciated. Cath Tuesday? Discussed with patient/family at bedside.
3. 6*  4.6   CL  99  96*   CO2  23  25   GLUCOSE  83  93   BUN  18  21   CREATININE  0.87  0.98   MG   --   2.1   CALCIUM  9.0  9.0     Plan: For Cardiac cath tomorrow. Patient agrees/expressed understanding. Discussed with Dr. Ponce rGeene.     Electronically signed by Henri Epps MD on 2/5/2018 at
carvedilol  3.125 mg Oral BID WC    valsartan  40 mg Oral Daily    bumetanide  1 mg Oral Daily    aspirin  81 mg Oral Daily    gabapentin  100 mg Oral TID    mometasone-formoterol  2 puff Inhalation BID    tamsulosin  0.4 mg Oral Daily    potassium chloride  20 mEq Oral BID WC       Assessment/ Plan :   Heart failure exacerbation - acute on chronic diastolic. Paroxysmal atrial fibrillation. Elevated troponin - probably secondary to ischemia. Will plan for cardiac cath tomorrow. Discussed procedure with patient. R/B/A discussed with patient. Discussed with Dr. Floyd Cline. Thank you very much for allowing us to participate in the care of this patient. Please call us with any questions.     Electronically signed by Rasta Peacock MD on 2/5/2018 at 3:34 PM
oriented, normal speech, no focal findings. Extremities - peripheral pulses normal, pedal edema persists but less than baseline. Negative Ananya's. Skin - normal coloration and turgor. Labs:  Hematology:  Recent Labs      02/01/18   1615 02/02/18   0629   WBC  9.1  8.8   HGB  10.1*  9.6*   HCT  30.9*  29.0*   PLT  247  219     Chemistry:  Recent Labs      02/01/18   1320  02/01/18   1615  02/01/18 2125 02/02/18   0629   NA  136  137   --   137   K  3.7  3.9   --   3.5*   CL  98  100   --   100   CO2  23  22   --   22   GLUCOSE  98  95   --   80   BUN  23  25*   --   22   CREATININE  1.02  1.20   --   0.97   MG   --    --   2.1   --    CALCIUM  9.4  9.3   --   9.0   CAION   --    --   1.31   --      Recent Labs      02/01/18 2125   CHOL  105   HDL  22*   LDLCHOLESTEROL  67   CHOLHDLRATIO  4.8   TRIG  80   VLDL  NOT REPORTED     Plan:  Current labs reviewed. Hb 9.6 with normocytic normochromic indices. Troponin levels elevated  EKG: ischemic changes. Awaiting echocardiogram report. Cardiology consult - Dr. Sadie Erazo. Continue current regimen. Discussed with patient and family at bedside. Follow-up labs ordered.     Electronically signed by Rina Mckeon MD on 2/2/2018 at 2:56 PM
 carvedilol  3.125 mg Oral BID WC    valsartan  40 mg Oral Daily    bumetanide  1 mg Oral Daily    aspirin  81 mg Oral Daily    gabapentin  100 mg Oral TID    mometasone-formoterol  2 puff Inhalation BID    tamsulosin  0.4 mg Oral Daily    potassium chloride  20 mEq Oral BID WC       Assessment/ Plan :   NSTEMI  Coronary artery disease. Heart failure exacerbation - acute on chronic diastolic. Paroxysmal atrial fibrillation. See post cath orders. Discussed with RN and family. Will add high intensity Statin therapy. Thank you very much for allowing us to participate in the care of this patient. Please call us with any questions.     Electronically signed by Chivo Jo MD on 2/7/2018 at 1:09 PM

## 2018-02-07 NOTE — DISCHARGE INSTR - DIET
 Good nutrition is important when healing from an illness, injury, or surgery. Follow any nutrition recommendations given to you during your hospital stay.  If you were given an oral nutrition supplement while in the hospital, continue to take this supplement at home. You can take it with meals, in-between meals, and/or before bedtime. These supplements can be purchased at most local grocery stores, pharmacies, and chain super-stores.  If you have any questions about your diet or nutrition, call the hospital and ask for the dietitian.   Cardiac

## 2018-03-30 PROBLEM — I46.9 CARDIAC ARREST (HCC): Status: ACTIVE | Noted: 2018-01-01

## 2018-03-31 LAB
CULTURE: NO GROWTH
CULTURE: NORMAL
Lab: NORMAL
SPECIMEN DESCRIPTION: NORMAL
STATUS: NORMAL

## 2023-10-31 NOTE — ED NOTES
Pt eating dinner tray with family at bedside       Luke Whitley RN  02/01/18 9204
Pt presents with c/o elevated BNP from PCP office earlier today. A+Ox4. States he has been weak and has had increased SOB. Wears home O2 intermittently. Resp even and nonlabored. Lungs diminished in bases. PPPB with 2+ edema to lower extremities. Skin p/w/d. Pain free at this time.      Beth Pna RN  02/01/18 3896
Report from [facility] , met with family and patient, reviewed progress notes from December admission. Pt resting in bed closing his eyes.        Bryan Blanchard RN  02/01/18 2407
Detail Level: Generalized
Detail Level: Simple
Detail Level: Detailed